# Patient Record
Sex: MALE | Race: WHITE | NOT HISPANIC OR LATINO | Employment: OTHER | URBAN - NONMETROPOLITAN AREA
[De-identification: names, ages, dates, MRNs, and addresses within clinical notes are randomized per-mention and may not be internally consistent; named-entity substitution may affect disease eponyms.]

---

## 2023-04-18 ENCOUNTER — HOSPITAL ENCOUNTER (OUTPATIENT)
Dept: DATA CONVERSION | Facility: HOSPITAL | Age: 73
End: 2023-04-18
Attending: OTOLARYNGOLOGY | Admitting: OTOLARYNGOLOGY
Payer: MEDICARE

## 2023-04-18 DIAGNOSIS — J34.2 DEVIATED NASAL SEPTUM: ICD-10-CM

## 2023-04-18 DIAGNOSIS — I25.2 OLD MYOCARDIAL INFARCTION: ICD-10-CM

## 2023-04-18 DIAGNOSIS — E78.5 HYPERLIPIDEMIA, UNSPECIFIED: ICD-10-CM

## 2023-04-18 DIAGNOSIS — E66.9 OBESITY, UNSPECIFIED: ICD-10-CM

## 2023-04-18 DIAGNOSIS — Z79.85 LONG-TERM (CURRENT) USE OF INJECTABLE NON-INSULIN ANTIDIABETIC DRUGS: ICD-10-CM

## 2023-04-18 DIAGNOSIS — N40.0 BENIGN PROSTATIC HYPERPLASIA WITHOUT LOWER URINARY TRACT SYMPTOMS: ICD-10-CM

## 2023-04-18 DIAGNOSIS — Z87.891 PERSONAL HISTORY OF NICOTINE DEPENDENCE: ICD-10-CM

## 2023-04-18 DIAGNOSIS — E11.40 TYPE 2 DIABETES MELLITUS WITH DIABETIC NEUROPATHY, UNSPECIFIED (MULTI): ICD-10-CM

## 2023-04-18 DIAGNOSIS — G47.33 OBSTRUCTIVE SLEEP APNEA (ADULT) (PEDIATRIC): ICD-10-CM

## 2023-04-18 DIAGNOSIS — Z79.84 LONG TERM (CURRENT) USE OF ORAL HYPOGLYCEMIC DRUGS: ICD-10-CM

## 2023-04-18 DIAGNOSIS — K21.9 GASTRO-ESOPHAGEAL REFLUX DISEASE WITHOUT ESOPHAGITIS: ICD-10-CM

## 2023-04-18 DIAGNOSIS — E03.9 HYPOTHYROIDISM, UNSPECIFIED: ICD-10-CM

## 2023-04-18 LAB — POCT GLUCOSE: 97 MG/DL (ref 74–99)

## 2023-09-03 ENCOUNTER — HOSPITAL ENCOUNTER (OUTPATIENT)
Dept: DATA CONVERSION | Facility: HOSPITAL | Age: 73
Discharge: HOME | End: 2023-09-06
Payer: MEDICARE

## 2023-09-03 DIAGNOSIS — I10 ESSENTIAL (PRIMARY) HYPERTENSION: ICD-10-CM

## 2023-09-03 DIAGNOSIS — E03.9 HYPOTHYROIDISM, UNSPECIFIED: ICD-10-CM

## 2023-09-03 DIAGNOSIS — R06.02 SHORTNESS OF BREATH: ICD-10-CM

## 2023-09-03 DIAGNOSIS — Z79.84 LONG TERM (CURRENT) USE OF ORAL HYPOGLYCEMIC DRUGS: ICD-10-CM

## 2023-09-03 DIAGNOSIS — I25.2 OLD MYOCARDIAL INFARCTION: ICD-10-CM

## 2023-09-03 DIAGNOSIS — R53.83 OTHER FATIGUE: ICD-10-CM

## 2023-09-03 DIAGNOSIS — R11.0 NAUSEA: ICD-10-CM

## 2023-09-03 DIAGNOSIS — Z79.85 LONG-TERM (CURRENT) USE OF INJECTABLE NON-INSULIN ANTIDIABETIC DRUGS: ICD-10-CM

## 2023-09-03 DIAGNOSIS — R07.9 CHEST PAIN, UNSPECIFIED: ICD-10-CM

## 2023-09-03 DIAGNOSIS — R06.09 OTHER FORMS OF DYSPNEA: ICD-10-CM

## 2023-09-03 DIAGNOSIS — E11.9 TYPE 2 DIABETES MELLITUS WITHOUT COMPLICATIONS (MULTI): ICD-10-CM

## 2023-09-03 DIAGNOSIS — Z79.899 OTHER LONG TERM (CURRENT) DRUG THERAPY: ICD-10-CM

## 2023-09-03 LAB — GLUCOSE BLD STRIP.AUTO-MCNC: 225 MG/DL (ref 65–99)

## 2023-09-04 LAB
ANION GAP SERPL CALCULATED.3IONS-SCNC: 11 MMOL/L (ref 0–19)
APPEARANCE PLAS: CLEAR
BUN SERPL-MCNC: 12 MG/DL (ref 8–25)
BUN/CREAT SERPL: 12 RATIO (ref 8–21)
CALCIUM SERPL-MCNC: 8.9 MG/DL (ref 8.5–10.4)
CHLORIDE SERPL-SCNC: 105 MMOL/L (ref 97–107)
CHOLEST SERPL-MCNC: 116 MG/DL (ref 133–200)
CHOLEST/HDLC SERPL: 3.1 RATIO
CO2 SERPL-SCNC: 25 MMOL/L (ref 24–31)
COLOR SPUN FLD: ABNORMAL
CREAT SERPL-MCNC: 1 MG/DL (ref 0.4–1.6)
DEPRECATED RDW RBC AUTO: 41 FL (ref 37–54)
ERYTHROCYTE [DISTWIDTH] IN BLOOD BY AUTOMATED COUNT: 12.9 % (ref 11.7–15)
FASTING STATUS PATIENT QL REPORTED: ABNORMAL
GFR SERPL CREATININE-BSD FRML MDRD: 80 ML/MIN/1.73 M2
GLUCOSE BLD STRIP.AUTO-MCNC: 129 MG/DL (ref 65–99)
GLUCOSE BLD STRIP.AUTO-MCNC: 141 MG/DL (ref 65–99)
GLUCOSE BLD STRIP.AUTO-MCNC: 95 MG/DL (ref 65–99)
GLUCOSE BLD STRIP.AUTO-MCNC: 96 MG/DL (ref 65–99)
GLUCOSE SERPL-MCNC: 95 MG/DL (ref 65–99)
HBA1C MFR BLD: 6 % (ref 4–6)
HCT VFR BLD AUTO: 40.5 % (ref 41–50)
HDLC SERPL-MCNC: 38 MG/DL
HGB BLD-MCNC: 13.7 GM/DL (ref 13.5–16.5)
HS TROPONIN T DELTA: ABNORMAL (ref 0–4)
LDLC SERPL CALC-MCNC: 64 MG/DL (ref 65–130)
MCH RBC QN AUTO: 29.5 PG (ref 26–34)
MCHC RBC AUTO-ENTMCNC: 33.8 % (ref 31–37)
MCV RBC AUTO: 87.3 FL (ref 80–100)
NRBC BLD-RTO: 0 /100 WBC
PLATELET # BLD AUTO: 221 K/UL (ref 150–450)
PMV BLD AUTO: 9.5 CU (ref 7–12.6)
POTASSIUM SERPL-SCNC: 4.2 MMOL/L (ref 3.4–5.1)
RBC # BLD AUTO: 4.64 M/UL (ref 4.5–5.5)
SODIUM SERPL-SCNC: 141 MMOL/L (ref 133–145)
T4 FREE SERPL-MCNC: 2.2 NG/DL (ref 0.9–1.7)
TRIGL SERPL-MCNC: 68 MG/DL (ref 40–150)
TROPONIN T SERPL-MCNC: 20 NG/L
TSH SERPL DL<=0.05 MIU/L-ACNC: 0.12 MIU/L (ref 0.27–4.2)
WBC # BLD AUTO: 5.4 K/UL (ref 4.5–11)

## 2023-09-05 LAB
ANION GAP SERPL CALCULATED.3IONS-SCNC: 12 MMOL/L (ref 0–19)
BUN SERPL-MCNC: 15 MG/DL (ref 8–25)
BUN/CREAT SERPL: 16.7 RATIO (ref 8–21)
CALCIUM SERPL-MCNC: 9.1 MG/DL (ref 8.5–10.4)
CHLORIDE SERPL-SCNC: 106 MMOL/L (ref 97–107)
CO2 SERPL-SCNC: 24 MMOL/L (ref 24–31)
CREAT SERPL-MCNC: 0.9 MG/DL (ref 0.4–1.6)
DEPRECATED RDW RBC AUTO: 40.3 FL (ref 37–54)
ERYTHROCYTE [DISTWIDTH] IN BLOOD BY AUTOMATED COUNT: 12.8 % (ref 11.7–15)
GFR SERPL CREATININE-BSD FRML MDRD: 91 ML/MIN/1.73 M2
GLUCOSE BLD STRIP.AUTO-MCNC: 100 MG/DL (ref 65–99)
GLUCOSE BLD STRIP.AUTO-MCNC: 103 MG/DL (ref 65–99)
GLUCOSE BLD STRIP.AUTO-MCNC: 97 MG/DL (ref 65–99)
GLUCOSE BLD STRIP.AUTO-MCNC: 99 MG/DL (ref 65–99)
GLUCOSE SERPL-MCNC: 102 MG/DL (ref 65–99)
HCT VFR BLD AUTO: 41.1 % (ref 41–50)
HGB BLD-MCNC: 14.1 GM/DL (ref 13.5–16.5)
MCH RBC QN AUTO: 29.7 PG (ref 26–34)
MCHC RBC AUTO-ENTMCNC: 34.3 % (ref 31–37)
MCV RBC AUTO: 86.7 FL (ref 80–100)
NRBC BLD-RTO: 0 /100 WBC
PLATELET # BLD AUTO: 204 K/UL (ref 150–450)
PMV BLD AUTO: 9.2 CU (ref 7–12.6)
POTASSIUM SERPL-SCNC: 4 MMOL/L (ref 3.4–5.1)
RBC # BLD AUTO: 4.74 M/UL (ref 4.5–5.5)
SODIUM SERPL-SCNC: 142 MMOL/L (ref 133–145)
WBC # BLD AUTO: 6.3 K/UL (ref 4.5–11)

## 2023-09-06 PROBLEM — J34.2 DEVIATED NASAL SEPTUM: Status: ACTIVE | Noted: 2023-09-06

## 2023-09-06 PROBLEM — M54.16 LUMBAR RADICULOPATHY: Status: ACTIVE | Noted: 2023-09-06

## 2023-09-06 PROBLEM — R06.2 WHEEZING: Status: ACTIVE | Noted: 2022-06-24

## 2023-09-06 PROBLEM — K57.92 DIVERTICULITIS: Status: ACTIVE | Noted: 2023-09-06

## 2023-09-06 PROBLEM — E11.65 TYPE 2 DIABETES MELLITUS WITH HYPERGLYCEMIA, WITHOUT LONG-TERM CURRENT USE OF INSULIN (MULTI): Status: ACTIVE | Noted: 2023-09-06

## 2023-09-06 PROBLEM — K60.2 ANAL FISSURE: Status: ACTIVE | Noted: 2023-09-06

## 2023-09-06 PROBLEM — E55.9 VITAMIN D DEFICIENCY: Status: ACTIVE | Noted: 2021-03-16

## 2023-09-06 PROBLEM — T14.8XXA SUPERFICIAL INJURY: Status: ACTIVE | Noted: 2023-09-06

## 2023-09-06 PROBLEM — K62.5 RECTAL HEMORRHAGE: Status: ACTIVE | Noted: 2023-09-06

## 2023-09-06 PROBLEM — G47.33 OBSTRUCTIVE SLEEP APNEA, ADULT: Status: ACTIVE | Noted: 2023-09-06

## 2023-09-06 PROBLEM — J34.3 HYPERTROPHY OF BOTH INFERIOR NASAL TURBINATES: Status: ACTIVE | Noted: 2023-09-06

## 2023-09-06 PROBLEM — J31.0 CHRONIC RHINITIS: Status: ACTIVE | Noted: 2023-09-06

## 2023-09-06 PROBLEM — R73.09 ELEVATED GLUCOSE LEVEL: Status: ACTIVE | Noted: 2021-03-16

## 2023-09-06 PROBLEM — M17.12 PRIMARY OSTEOARTHRITIS OF LEFT KNEE: Status: ACTIVE | Noted: 2023-09-06

## 2023-09-06 PROBLEM — E03.9 HYPOTHYROIDISM: Status: ACTIVE | Noted: 2021-07-20

## 2023-09-06 PROBLEM — Z96.651 HISTORY OF TOTAL RIGHT KNEE REPLACEMENT: Status: ACTIVE | Noted: 2023-09-06

## 2023-09-06 PROBLEM — E53.8 VITAMIN B 12 DEFICIENCY: Status: ACTIVE | Noted: 2021-03-16

## 2023-09-06 PROBLEM — E66.01 MORBID OBESITY (MULTI): Status: ACTIVE | Noted: 2023-09-06

## 2023-09-06 PROBLEM — K21.9 CHRONIC GERD: Status: ACTIVE | Noted: 2022-11-01

## 2023-09-06 PROBLEM — K64.8 BLEEDING INTERNAL HEMORRHOIDS: Status: ACTIVE | Noted: 2023-09-06

## 2023-09-06 PROBLEM — E06.3 AUTOIMMUNE THYROIDITIS: Status: ACTIVE | Noted: 2023-09-06

## 2023-09-06 PROBLEM — E78.2 MIXED HYPERLIPIDEMIA: Status: ACTIVE | Noted: 2023-09-06

## 2023-09-06 PROBLEM — H66.93 BOM (BILATERAL OTITIS MEDIA): Status: ACTIVE | Noted: 2019-10-17

## 2023-09-06 PROBLEM — G47.00 INSOMNIA: Status: ACTIVE | Noted: 2020-06-25

## 2023-09-06 PROBLEM — R41.3 LOSS OF MEMORY: Status: ACTIVE | Noted: 2019-10-17

## 2023-09-06 PROBLEM — R73.09 ELEVATED HEMOGLOBIN A1C: Status: ACTIVE | Noted: 2021-03-23

## 2023-09-06 LAB — GLUCOSE BLD STRIP.AUTO-MCNC: 100 MG/DL (ref 65–99)

## 2023-09-06 RX ORDER — METHYLPREDNISOLONE 4 MG/1
TABLET ORAL
COMMUNITY
End: 2024-02-05

## 2023-09-06 RX ORDER — FLUTICASONE PROPIONATE AND SALMETEROL 250; 50 UG/1; UG/1
1 POWDER RESPIRATORY (INHALATION)
COMMUNITY
Start: 2022-06-24 | End: 2024-04-24 | Stop reason: ALTCHOICE

## 2023-09-06 RX ORDER — ROSUVASTATIN CALCIUM 5 MG/1
1 TABLET, COATED ORAL NIGHTLY
COMMUNITY
Start: 2021-07-20 | End: 2024-02-05

## 2023-09-06 RX ORDER — PIOGLITAZONEHYDROCHLORIDE 15 MG/1
TABLET ORAL
COMMUNITY
Start: 2022-05-24

## 2023-09-06 RX ORDER — FLUTICASONE PROPIONATE 50 MCG
2 SPRAY, SUSPENSION (ML) NASAL DAILY
COMMUNITY
Start: 2019-11-11

## 2023-09-06 RX ORDER — ACETAMINOPHEN, DIPHENHYDRAMINE HCL, PHENYLEPHRINE HCL 325; 25; 5 MG/1; MG/1; MG/1
1 TABLET ORAL
COMMUNITY
Start: 2021-07-15 | End: 2024-02-05 | Stop reason: WASHOUT

## 2023-09-06 RX ORDER — IBUPROFEN 200 MG
1 TABLET ORAL EVERY 6 HOURS PRN
COMMUNITY

## 2023-09-06 RX ORDER — DULAGLUTIDE 1.5 MG/.5ML
INJECTION, SOLUTION SUBCUTANEOUS
COMMUNITY

## 2023-09-06 RX ORDER — ROSUVASTATIN CALCIUM 10 MG/1
1 TABLET, COATED ORAL DAILY
COMMUNITY
Start: 2022-05-24 | End: 2024-02-05 | Stop reason: WASHOUT

## 2023-09-06 RX ORDER — DULAGLUTIDE 0.75 MG/.5ML
0.75 INJECTION, SOLUTION SUBCUTANEOUS
COMMUNITY
Start: 2022-11-01 | End: 2024-02-05 | Stop reason: WASHOUT

## 2023-09-06 RX ORDER — FLUTICASONE PROPIONATE 93 UG/1
1 SPRAY, METERED NASAL 2 TIMES DAILY
COMMUNITY
Start: 2021-11-15 | End: 2024-02-05 | Stop reason: WASHOUT

## 2023-09-06 RX ORDER — LEVOTHYROXINE SODIUM 300 UG/1
TABLET ORAL
COMMUNITY
Start: 2021-07-15 | End: 2024-02-05

## 2023-09-06 RX ORDER — METFORMIN HYDROCHLORIDE 500 MG/1
500 TABLET, EXTENDED RELEASE ORAL 2 TIMES DAILY
COMMUNITY
Start: 2023-07-19 | End: 2024-02-05 | Stop reason: WASHOUT

## 2023-09-06 RX ORDER — BLOOD SUGAR DIAGNOSTIC
STRIP MISCELLANEOUS DAILY
COMMUNITY
Start: 2023-01-26 | End: 2024-04-24 | Stop reason: SDUPTHER

## 2023-09-06 RX ORDER — BUDESONIDE 0.5 MG/2ML
INHALANT ORAL
COMMUNITY
Start: 2023-04-18 | End: 2024-02-05 | Stop reason: WASHOUT

## 2023-09-06 RX ORDER — PANTOPRAZOLE SODIUM 40 MG/1
1 TABLET, DELAYED RELEASE ORAL DAILY
COMMUNITY
Start: 2021-03-16 | End: 2023-11-14 | Stop reason: SDUPTHER

## 2023-09-06 RX ORDER — HYDROCODONE BITARTRATE AND ACETAMINOPHEN 5; 325 MG/1; MG/1
1 TABLET ORAL EVERY 6 HOURS PRN
COMMUNITY
End: 2024-04-24 | Stop reason: ALTCHOICE

## 2023-09-06 RX ORDER — LEVOCETIRIZINE DIHYDROCHLORIDE 5 MG/1
5 TABLET, FILM COATED ORAL EVERY EVENING
COMMUNITY
Start: 2019-11-11 | End: 2024-04-24 | Stop reason: ALTCHOICE

## 2023-09-06 RX ORDER — AZELASTINE 1 MG/ML
2 SPRAY, METERED NASAL 2 TIMES DAILY
COMMUNITY
Start: 2021-03-16 | End: 2024-02-05

## 2023-09-06 RX ORDER — ACETAMINOPHEN 500 MG
TABLET ORAL
COMMUNITY
Start: 2021-07-15

## 2023-09-07 VITALS
BODY MASS INDEX: 34.4 KG/M2 | DIASTOLIC BLOOD PRESSURE: 85 MMHG | SYSTOLIC BLOOD PRESSURE: 142 MMHG | HEIGHT: 70 IN | WEIGHT: 240.3 LBS | TEMPERATURE: 97 F | HEART RATE: 77 BPM | RESPIRATION RATE: 17 BRPM

## 2023-09-14 NOTE — H&P
History of Present Illness:   History Present Illness:  Reason for surgery: Difficulty breathing   HPI:    72-year-old male presents today for endoscopic sinus surgery and septoplasty.  He saw Dr. Florse in the ENT office on 2/22/2023, note is as follows:     Mr. Espinosa is a 71 yo M. He cannot breath well from his nose for years. It is worse at his right side.  He has tried nasal steroid spray for months, could nor find relief.  On examination, nasal septum deviated to right, right nasal passage looks tight. Dryness crusting in left nasal cavity (+)  Dx:  1- Deviated nasal septum  Plan:  1- CT sinus for surgical planning  2- septoplasty, turbinoplasty    Today he presents as an add-on case.  He has more the same symptoms they continue to slowly worsen.  His symptoms have bothered him for many years.  He denies trauma that may have caused the deviated  septum.  He does note occasional blood when blowing his nose.  No CT on record for surgical planning.    PMHx: DM, ANDREI, hypothyroidism, GERD, BPH  PSHx: TKA, neck surgery  SHX: Former smoker  FHX: noncontributory    Allergies:        Allergies:  ·  Percocet : Hives/Urticaria       Intolerances:  ·  nitroglycerin : ECG Abnormal (Moderate)    Home Medication Review:   Home Medications Reviewed: yes     Impression/Procedure:   ·  Impression and Planned Procedure: Deviated nasal septum, hypertrophy of inferior nasal turbinates  ESS, septoplasty       ERAS (Enhanced Recovery After Surgery):  ·  ERAS Patient: no     Review of Systems:   Review of Systems:  Constitutional: NEGATIVE: Fever, Chills     ENMT: POSITIVE: Nasal Congestion; NEGATIVE: Nasal  Discharge, Throat Pain     Respiratory: NEGATIVE: Shortness of Breath     Cardiac: NEGATIVE: Chest Pain     Gastrointestinal: NEGATIVE: Abdominal Pain     All Other Systems: All other systems reviewed and  are negative       Vital Signs:  Temperature C: 36.1 degrees C   Temperature F: 96.9 degrees F   Heart Rate: 77 beats per  minute   Respiratory Rate: 17 breath per minute   Blood Pressure Systolic: 142 mm/Hg   Blood Pressure Diastolic: 85 mm/Hg     Physical Exam by System:    Constitutional: Well developed, awake/alert/oriented  x3, no distress, alert and cooperative   Eyes: EOMI intact   ENMT: no apparent injury, no lesions seen  Visible deviated septum to the right   Head/Neck: Neck supple, no apparent injury   Respiratory/Thorax: CTAB   Cardiovascular: Regular, rate and rhythm, no murmurs   Gastrointestinal: No obvious abdominal distention   Musculoskeletal: Moving all extremities without difficulty   Extremities: No edema   Neurological: alert and oriented x3   Psychological: Appropriate mood and behavior   Skin: Warm and dry, no lesions, no rashes     Consent:   COVID-19 Consent:  ·  COVID-19 Risk Consent Surgeon has reviewed key risks related to the risk of marti COVID-19 and if they contract COVID-19 what the risks are.     Attestation:   Note Completion:  I am a:  Advanced Practice Provider   Attending Only - Shared Visit with Advanced Practice Provider This is a shared visit.  I have reviewed the Advanced Practice Provider?s encounter note, approve the Advanced Practice Provider?s documentation,  and provide the following additional information from my personal encounter.    Comments/ Additional Findings    none          Electronic Signatures:  Ra Mancuso (PAC)  (Signed 18-Apr-2023 13:25)   Authored: History of Present Illness, Allergies, Home  Medication Review, Impression/Procedure, ERAS, Review of Systems, Physical Exam, Consent, Note Completion  Conner Flores)  (Signed 18-Apr-2023 16:24)   Authored: Note Completion   Co-Signer: History of Present Illness, Allergies, Home Medication Review, Impression/Procedure, ERAS, Review of Systems, Physical Exam,  Consent, Note Completion      Last Updated: 18-Apr-2023 16:24 by Conner Flores)

## 2023-09-26 ENCOUNTER — HOSPITAL ENCOUNTER (OUTPATIENT)
Dept: DATA CONVERSION | Facility: HOSPITAL | Age: 73
Discharge: HOME | End: 2023-09-26
Payer: MEDICARE

## 2023-09-26 DIAGNOSIS — R05.1 ACUTE COUGH: ICD-10-CM

## 2023-09-28 ENCOUNTER — HOSPITAL ENCOUNTER (OUTPATIENT)
Dept: DATA CONVERSION | Facility: HOSPITAL | Age: 73
Discharge: HOME | End: 2023-09-28
Payer: MEDICARE

## 2023-09-28 DIAGNOSIS — E06.3 AUTOIMMUNE THYROIDITIS: ICD-10-CM

## 2023-09-28 LAB — TSH SERPL DL<=0.05 MIU/L-ACNC: 0.62 MIU/L (ref 0.27–4.2)

## 2023-10-02 NOTE — OP NOTE
Post Operative Note:     PreOp Diagnosis: Deviated nasal septum   Post-Procedure Diagnosis: Deviated nasal septum   Procedure: 1.  Septoplasty  2.  radiofrequency turbinate reduction   Surgeon: Conner Flores MD   Resident/Fellow/Other Assistant: None   Anesthesia: General LR   Estimated Blood Loss (mL): 10   Specimen: no   Complications: None   Findings: septum severely deviated to right.   Patient Returned To/Condition: PACU-stable     Attestation:   Note Completion:  Attending Attestation I performed the procedure without a resident    Comments/ Additional Findings    HISTORY:    Mr. Espinosa is a 71 yo M. He cannot breath well from his nose for years. It is worse at his right side.  He has tried nasal steroid spray for months, could nor find relief.  On examination, nasal septum deviated to right, right nasal passage looks tight. Dryness crusting in left nasal cavity (+)    Dx:  1- Deviated nasal septum    Plan:  1- CT sinus for surgical planning  2- septoplasty, turbinoplasty    DESCRIPTION OF THE PROCEDURE:  The patient was brought to the operative suite, placed supine on the operative table.  Anesthesia was administered by Anesthesia Department.  Please refer  to their records for details.  Time-out was already called in PACU.  The patient was draped and prepped for the operation.   Endoscope was advanced through the patient's nasal cavity. On examination, septum was deviated to right severely. There was chronic inflammation of nasal mucosa.  Septal mucosa was infiltrated with lidocaine, bupivacaine and epinephrine.  A right modified Wray incision was done.  Mucoperichondrial flaps were raised on both sides. Curved iris scissors  and Lashay knife was used to incise the cartilaginous portion of the septum superiorly. A quadrangular piece of cartilage was resected.  Blakesley, freer elevators, trucut and pituitary  forceps were used to remove the deviated portions of cartilaginous and bony septum. L strut  was left to support the dorsum and the tip of the nose.  Radiofrequency was applied to inferior and middle turbinates to shrink them.  Septum stapler was used to suture the septal mucosal flaps. Septoplasty incision was sutured back using 5.0 fast absorbing gut.  Merocel with bacitracin were placed into both nasal cavities. Procedure was concluded.    DISPOSITION:  Discharge home.    FOLLOW UP:  In 3 weeks in ENT clinic.    MEDICATIONS:  1- Oral antibiotic tab,  2- Tylenol number 3, as needed for pain  3- Nasal rinse and antibiotic ointment inside the nose  twice a day.        Electronic Signatures:  Ra Mancuso (PAC)  (Signed 18-Apr-2023 13:37)   Authored: Post Operative Note, Note Completion  Conner Flores)  (Signed 18-Apr-2023 16:06)   Authored: Post Operative Note, Note Completion   Co-Signer: Post Operative Note, Note Completion      Last Updated: 18-Apr-2023 16:06 by Conner Flores)

## 2023-10-24 ENCOUNTER — OFFICE VISIT (OUTPATIENT)
Dept: CARDIOLOGY | Facility: CLINIC | Age: 73
End: 2023-10-24
Payer: MEDICARE

## 2023-10-24 VITALS
OXYGEN SATURATION: 96 % | HEART RATE: 60 BPM | TEMPERATURE: 98.6 F | WEIGHT: 239 LBS | RESPIRATION RATE: 18 BRPM | BODY MASS INDEX: 34.22 KG/M2 | DIASTOLIC BLOOD PRESSURE: 88 MMHG | HEIGHT: 70 IN | SYSTOLIC BLOOD PRESSURE: 160 MMHG

## 2023-10-24 DIAGNOSIS — R06.02 SHORTNESS OF BREATH: ICD-10-CM

## 2023-10-24 DIAGNOSIS — T73.3XXD FATIGUE DUE TO EXCESSIVE EXERTION, SUBSEQUENT ENCOUNTER: ICD-10-CM

## 2023-10-24 DIAGNOSIS — I10 PRIMARY HYPERTENSION: Primary | ICD-10-CM

## 2023-10-24 DIAGNOSIS — I10 ESSENTIAL HYPERTENSION: ICD-10-CM

## 2023-10-24 DIAGNOSIS — E78.2 MIXED HYPERLIPIDEMIA: ICD-10-CM

## 2023-10-24 PROBLEM — G89.29 CHRONIC INTRACTABLE HEADACHE: Status: ACTIVE | Noted: 2023-10-24

## 2023-10-24 PROBLEM — R41.3 MEMORY LOSS: Status: ACTIVE | Noted: 2023-10-24

## 2023-10-24 PROBLEM — G56.01 CARPAL TUNNEL SYNDROME OF RIGHT WRIST: Status: ACTIVE | Noted: 2023-10-24

## 2023-10-24 PROBLEM — R20.2 PARESTHESIAS: Status: ACTIVE | Noted: 2023-10-24

## 2023-10-24 PROBLEM — G44.52 NEW DAILY PERSISTENT HEADACHE: Status: ACTIVE | Noted: 2023-10-24

## 2023-10-24 PROBLEM — M54.16 LUMBAR RADICULOPATHY: Status: ACTIVE | Noted: 2019-01-11

## 2023-10-24 PROBLEM — R51.9 CHRONIC INTRACTABLE HEADACHE: Status: ACTIVE | Noted: 2023-10-24

## 2023-10-24 PROCEDURE — 1159F MED LIST DOCD IN RCRD: CPT | Performed by: INTERNAL MEDICINE

## 2023-10-24 PROCEDURE — 3079F DIAST BP 80-89 MM HG: CPT | Performed by: INTERNAL MEDICINE

## 2023-10-24 PROCEDURE — 3077F SYST BP >= 140 MM HG: CPT | Performed by: INTERNAL MEDICINE

## 2023-10-24 PROCEDURE — 93000 ELECTROCARDIOGRAM COMPLETE: CPT | Performed by: INTERNAL MEDICINE

## 2023-10-24 PROCEDURE — 1126F AMNT PAIN NOTED NONE PRSNT: CPT | Performed by: INTERNAL MEDICINE

## 2023-10-24 PROCEDURE — 1036F TOBACCO NON-USER: CPT | Performed by: INTERNAL MEDICINE

## 2023-10-24 PROCEDURE — 99214 OFFICE O/P EST MOD 30 MIN: CPT | Performed by: INTERNAL MEDICINE

## 2023-10-24 RX ORDER — AMOXICILLIN AND CLAVULANATE POTASSIUM 500; 125 MG/1; MG/1
1 TABLET, FILM COATED ORAL 2 TIMES DAILY
COMMUNITY
Start: 2022-12-13 | End: 2022-12-23

## 2023-10-24 RX ORDER — INDOMETHACIN 50 MG/1
50 CAPSULE ORAL
COMMUNITY
End: 2024-04-24 | Stop reason: ALTCHOICE

## 2023-10-24 RX ORDER — VERAPAMIL HYDROCHLORIDE 240 MG/1
240 TABLET, FILM COATED, EXTENDED RELEASE ORAL DAILY
COMMUNITY
End: 2023-10-24

## 2023-10-24 RX ORDER — DULAGLUTIDE 1.5 MG/.5ML
1.5 INJECTION, SOLUTION SUBCUTANEOUS
COMMUNITY
Start: 2023-03-01 | End: 2024-02-05 | Stop reason: WASHOUT

## 2023-10-24 RX ORDER — TIZANIDINE 4 MG/1
4 TABLET ORAL EVERY 6 HOURS PRN
COMMUNITY

## 2023-10-24 RX ORDER — BLOOD SUGAR DIAGNOSTIC
1 STRIP MISCELLANEOUS SEE ADMIN INSTRUCTIONS
COMMUNITY
Start: 2023-01-26

## 2023-10-24 RX ORDER — METHYLPREDNISOLONE 4 MG/1
4 TABLET ORAL DAILY
COMMUNITY
End: 2024-02-05

## 2023-10-24 RX ORDER — GABAPENTIN 300 MG/1
300 CAPSULE ORAL DAILY
COMMUNITY
End: 2024-04-24 | Stop reason: ALTCHOICE

## 2023-10-24 RX ORDER — PANTOPRAZOLE SODIUM 40 MG/1
40 TABLET, DELAYED RELEASE ORAL
COMMUNITY
End: 2024-03-05 | Stop reason: SDUPTHER

## 2023-10-24 RX ORDER — BUDESONIDE 0.5 MG/2ML
0.5 INHALANT ORAL
COMMUNITY
Start: 2023-04-18 | End: 2024-02-05 | Stop reason: WASHOUT

## 2023-10-24 RX ORDER — MUPIROCIN 20 MG/G
1 OINTMENT TOPICAL 2 TIMES DAILY
COMMUNITY
Start: 2023-10-04

## 2023-10-24 RX ORDER — DIVALPROEX SODIUM 250 MG/1
250 TABLET, FILM COATED, EXTENDED RELEASE ORAL 2 TIMES DAILY
COMMUNITY
End: 2024-02-05

## 2023-10-24 RX ORDER — ASPIRIN 81 MG/1
81 TABLET ORAL DAILY
COMMUNITY

## 2023-10-24 RX ORDER — ACETAMINOPHEN AND CODEINE PHOSPHATE 300; 30 MG/1; MG/1
1 TABLET ORAL EVERY 4 HOURS PRN
COMMUNITY
Start: 2023-04-18 | End: 2024-04-24 | Stop reason: ALTCHOICE

## 2023-10-24 RX ORDER — METFORMIN HYDROCHLORIDE 500 MG/1
500 TABLET, EXTENDED RELEASE ORAL 2 TIMES DAILY
COMMUNITY
Start: 2023-07-19 | End: 2024-02-05 | Stop reason: SDUPTHER

## 2023-10-24 RX ORDER — RANOLAZINE 500 MG/1
500 TABLET, EXTENDED RELEASE ORAL 2 TIMES DAILY
COMMUNITY
Start: 2023-09-06 | End: 2023-10-24 | Stop reason: ALTCHOICE

## 2023-10-24 RX ORDER — TRAZODONE HYDROCHLORIDE 100 MG/1
100 TABLET ORAL NIGHTLY
COMMUNITY
End: 2024-04-24 | Stop reason: ALTCHOICE

## 2023-10-24 RX ORDER — CHLORPHENIRAMINE MALEATE 4 MG
4 TABLET ORAL EVERY 6 HOURS PRN
COMMUNITY
End: 2024-02-05

## 2023-10-24 RX ORDER — PIOGLITAZONEHYDROCHLORIDE 15 MG/1
15 TABLET ORAL DAILY
COMMUNITY
Start: 2023-07-19 | End: 2024-02-05 | Stop reason: WASHOUT

## 2023-10-24 RX ORDER — ROSUVASTATIN CALCIUM 10 MG/1
10 TABLET, COATED ORAL DAILY
COMMUNITY
Start: 2023-06-27 | End: 2024-02-05 | Stop reason: WASHOUT

## 2023-10-24 RX ORDER — LOSARTAN POTASSIUM AND HYDROCHLOROTHIAZIDE 12.5; 5 MG/1; MG/1
1 TABLET ORAL DAILY
Qty: 30 TABLET | Refills: 11 | Status: SHIPPED | OUTPATIENT
Start: 2023-10-24 | End: 2024-02-05 | Stop reason: WASHOUT

## 2023-10-24 RX ORDER — POLYETHYLENE GLYCOL 3350, SODIUM CHLORIDE, SODIUM BICARBONATE, POTASSIUM CHLORIDE 420; 11.2; 5.72; 1.48 G/4L; G/4L; G/4L; G/4L
4000 POWDER, FOR SOLUTION ORAL ONCE
COMMUNITY
Start: 2023-01-10 | End: 2024-04-24 | Stop reason: ALTCHOICE

## 2023-10-24 RX ORDER — LEVOTHYROXINE SODIUM 200 UG/1
200 TABLET ORAL DAILY
COMMUNITY
End: 2023-10-24 | Stop reason: SDUPTHER

## 2023-10-24 RX ORDER — LEVOTHYROXINE SODIUM 300 UG/1
300 TABLET ORAL DAILY
COMMUNITY
End: 2024-02-05

## 2023-10-24 ASSESSMENT — ENCOUNTER SYMPTOMS
LOSS OF SENSATION IN FEET: 1
OCCASIONAL FEELINGS OF UNSTEADINESS: 0
DEPRESSION: 0

## 2023-10-24 ASSESSMENT — PATIENT HEALTH QUESTIONNAIRE - PHQ9
2. FEELING DOWN, DEPRESSED OR HOPELESS: NOT AT ALL
SUM OF ALL RESPONSES TO PHQ9 QUESTIONS 1 AND 2: 0
1. LITTLE INTEREST OR PLEASURE IN DOING THINGS: NOT AT ALL

## 2023-10-24 ASSESSMENT — PAIN SCALES - GENERAL: PAINLEVEL: 0-NO PAIN

## 2023-10-24 NOTE — PROGRESS NOTES
Subjective   Chief Complaint   Patient presents with    Hospital Follow-up     Mr. Espinosa is present for his Follow up appt after having a stay         Patient with a history of for hypertension, obesity, hypothyroidism.  Patient was admitted in September at the [facility]  Patient had a nuclear stress test essentially negative for ischemia with ejection fraction 56%.  Now here for office follow-up.  No active angina CHF signs symptoms.  Shortness of breath with mild-moderate activity.  Currently only taking cardiac medications for lipid therapy Crestor.  Underlying morbid obesity.    History reviewed. No pertinent past medical history.  Past Surgical History:   Procedure Laterality Date    NECK SURGERY Right     TOTAL KNEE ARTHROPLASTY Right 2017     No relevant family history has been documented for this patient.  Current Outpatient Medications   Medication Sig Dispense Refill    aspirin 81 mg EC tablet Take 1 tablet (81 mg) by mouth once daily.      chlorpheniramine (Chlor-Trimeton) 4 mg tablet Take 1 tablet (4 mg) by mouth every 6 hours if needed for allergies.      levothyroxine (Synthroid, Unithroid) 300 mcg tablet Take 1 tablet (300 mcg) by mouth once daily.      metFORMIN  mg 24 hr tablet Take 1 tablet (500 mg) by mouth 2 times a day.      OneTouch Ultra Test strip 1 strip by Does not apply route see administration instructions.      pantoprazole (ProtoNix) 40 mg EC tablet Take 1 tablet (40 mg) by mouth once daily in the morning. Take before meals.      pioglitazone (Actos) 15 mg tablet Take 1 tablet (15 mg) by mouth once daily.      rosuvastatin (Crestor) 10 mg tablet Take 1 tablet (10 mg) by mouth once daily.      Trulicity 1.5 mg/0.5 mL pen injector injection Inject 1.5 mg under the skin 1 (one) time per week.      acetaminophen-codeine (Tylenol w/ Codeine #3) 300-30 mg tablet Take 1 tablet by mouth every 4 hours if needed for severe pain (7 - 10).      budesonide (Pulmicort) 0.5 mg/2 mL  "nebulizer solution Take 2 mL (0.5 mg) by nebulization once daily.      divalproex (Depakote ER) 250 mg 24 hr tablet Take 1 tablet (250 mg) by mouth 2 times a day.      gabapentin (Neurontin) 300 mg capsule Take 1 capsule (300 mg) by mouth once daily.      indomethacin (Indocin) 50 mg capsule Take 1 capsule (50 mg) by mouth 2 times a day with meals.      methylPREDNISolone (Medrol, Roman,) 4 mg tablets Take 1 tablet (4 mg) by mouth once daily.      mupirocin (Bactroban) 2 % ointment Apply 1 Application topically 2 times a day.      polyethylene glycol-electrolytes 420 gram solution Take 4,000 mL by mouth 1 time.      tiZANidine (Zanaflex) 4 mg tablet Take 1 tablet (4 mg) by mouth every 6 hours if needed for muscle spasms.      traZODone (Desyrel) 100 mg tablet Take 1 tablet (100 mg) by mouth once daily at bedtime.       No current facility-administered medications for this visit.      reports that he has quit smoking. His smoking use included cigarettes. He has a 60.00 pack-year smoking history. He has never used smokeless tobacco. He reports that he does not currently use alcohol.  Nitroglycerin and Oxycodone-acetaminophen  Primary hypertension    Vitals:    10/24/23 1311   BP: 160/88   Pulse: 60   Resp: 18   Temp: 37 °C (98.6 °F)   TempSrc: Core   SpO2: 96%   Weight: 108 kg (239 lb)   Height: 1.778 m (5' 10\")   PainSc: 0-No pain      BMI:Body mass index is 34.29 kg/m².   General Cardiology:  General Appearance: Alert, oriented and in no acute distress.  HEENT: extra ocular movements intact (EOMI), pupils equal,  round, reactive to light and accommodation (PERRLA).  Carotid Upstroke: no bruit, normal.  Jugular Venous Distention (JVD): flat.  Chest: normal.  Lungs: Clear to auscultation,   Heart Sounds: no S3 or S4, normal S1, S2, regular rate.  Murmur, Click, Gallop: Soft systolic murmur.  Abdomen: no hepatomegaly, no masses felt, soft.  Extremities: No leg edema.  Peripheral pulses: 2 plus bilateral.  NEUROLOGY " Cranial nerves II-XII grossly intact.     Patient Active Problem List   Diagnosis    Carpal tunnel syndrome of right wrist    Chronic intractable headache    Lumbar radiculopathy    Memory loss    New daily persistent headache    Paresthesias       Problem List Items Addressed This Visit    None  Visit Diagnoses       Primary hypertension    -  Primary    Relevant Orders    ECG 12 Lead    Shortness of breath        Fatigue due to excessive exertion, subsequent encounter               Patient seen and examined underlying history of hypertension hyperlipidemia.  Patient also history of diabetes.  Underlying morbid obesity status post negative nuclear stress test for ischemia.  EKG was done essentially showed underlying sinus rhythm 64 with a nonspecific ST-T changes seen with a left axis deviation.  Patient does have underlying borderline hypertension add patient on a low-dose of losartan/HCTZ.  Modify risk factor.  Advised patient to check blood pressure twice a day for next 10 days and give us a call if her blood pressure remains above 170 systolic and diastolic above 95.  Meanwhile cut back on salt, caffeine.  If blood pressure persistently stays above 200 systolic then go to nearest emergency room or call 911.  Diet and exercise reviewed with patient..advice to walk about 10,000 steps or about 2 hours during day time. Cut back on salt, sugar and flour.  Advised patient to avoid lunch meats, canned soups, pizzas, bread rolls, and sandwiches. Advised patient to limit salt intake 1,500 mg daily. Advised patient to exercise 30 mins/3 times a week including treadmill or aerobic type, Goal to achieve 65% target HR.    Esvin Lambert MD

## 2023-11-01 ENCOUNTER — TELEPHONE (OUTPATIENT)
Dept: PHARMACY | Facility: HOSPITAL | Age: 73
End: 2023-11-01
Payer: MEDICARE

## 2023-11-01 NOTE — TELEPHONE ENCOUNTER
Population Health: Outreach by Ambulatory Pharmacy Team    Payor: [employer]ah[employer] PLATA  Reason: Adherence  Medication: ROSUVASTATIN TAB 10MG  Outcome: Patient Reached: Barriers Identified, Patient was not available at this time.     SHEA ROUSSEAU CPhT

## 2023-11-14 DIAGNOSIS — K21.9 CHRONIC GERD: ICD-10-CM

## 2023-11-14 RX ORDER — PANTOPRAZOLE SODIUM 40 MG/1
40 TABLET, DELAYED RELEASE ORAL DAILY
Qty: 90 TABLET | Refills: 3 | Status: SHIPPED | OUTPATIENT
Start: 2023-11-14 | End: 2024-02-05 | Stop reason: WASHOUT

## 2023-11-20 ENCOUNTER — TELEPHONE (OUTPATIENT)
Dept: PHARMACY | Facility: HOSPITAL | Age: 73
End: 2023-11-20
Payer: MEDICARE

## 2024-01-18 NOTE — PROGRESS NOTES
HPI             Current Outpatient Medications:     acetaminophen-codeine (Tylenol w/ Codeine #3) 300-30 mg tablet, Take 1 tablet by mouth every 4 hours if needed for severe pain (7 - 10)., Disp: , Rfl:     aspirin 81 mg EC tablet, Take 1 tablet (81 mg) by mouth once daily., Disp: , Rfl:     azelastine (Astelin) 137 mcg (0.1 %) nasal spray, Administer 2 sprays into each nostril 2 times a day., Disp: , Rfl:     blood sugar diagnostic (ONETOUCH ULTRA BLUE TEST STRIP MISC), Instructions: use one strip daily and as needed if symptomatic; Dx E11.9, Disp: , Rfl:     budesonide (Pulmicort) 0.5 mg/2 mL nebulizer solution, , Disp: , Rfl:     budesonide (Pulmicort) 0.5 mg/2 mL nebulizer solution, USE 1 VIAL( 2 MILLILITERS) ONCE A DAY AS DIRECTED, Disp: 60 mL, Rfl: 2    budesonide (Pulmicort) 0.5 mg/2 mL nebulizer solution, Take 2 mL (0.5 mg) by nebulization once daily., Disp: , Rfl:     chlorpheniramine (Chlor-Trimeton) 4 mg tablet, Take 1 tablet (4 mg) by mouth every 6 hours if needed for allergies., Disp: , Rfl:     cholecalciferol (Vitamin D-3) 5,000 Units tablet, Take by mouth., Disp: , Rfl:     cyanocobalamin, vitamin B-12, (Vitamin B-12) 5,000 mcg tablet, sublingual, Take 1 tablet by mouth., Disp: , Rfl:     divalproex (Depakote ER) 250 mg 24 hr tablet, Take 1 tablet (250 mg) by mouth 2 times a day., Disp: , Rfl:     dulaglutide (Trulicity) 0.75 mg/0.5 mL pen injector, Inject 0.75 mg under the skin., Disp: , Rfl:     dulaglutide (Trulicity) 1.5 mg/0.5 mL pen injector injection, INJECT CONTENTS OF ONE PEN, (1.5MG), UNDER THE SKIN WEEKLY for 28, Disp: , Rfl:     fluticasone (Flonase Allergy Relief) 50 mcg/actuation nasal spray, Administer 2 sprays into each nostril once daily., Disp: , Rfl:     fluticasone propion-salmeteroL (Advair Diskus) 250-50 mcg/dose diskus inhaler, Inhale 1 puff 2 times a day., Disp: , Rfl:     fluticasone propionate (Xhance) 93 mcg/actuation aerosol breath activated, Administer 1 puff into each  nostril 2 times a day., Disp: , Rfl:     gabapentin (Neurontin) 300 mg capsule, Take 1 capsule (300 mg) by mouth once daily., Disp: , Rfl:     HYDROcodone-acetaminophen (Norco) 5-325 mg tablet, Take 1 tablet by mouth every 6 hours if needed., Disp: , Rfl:     ibuprofen 200 mg tablet, Take 1 tablet (200 mg) by mouth every 6 hours if needed., Disp: , Rfl:     indomethacin (Indocin) 50 mg capsule, Take 1 capsule (50 mg) by mouth 2 times a day with meals., Disp: , Rfl:     levocetirizine (Xyzal) 5 mg tablet, Take 1 tablet (5 mg) by mouth once daily in the evening., Disp: , Rfl:     levothyroxine (Synthroid, Levoxyl) 200 mcg tablet, take 1 tablet by mouth daily, Disp: 30 tablet, Rfl: 0    levothyroxine (Synthroid, Unithroid) 300 mcg tablet, take 1 tablet by mouth Monday thru Saturday. take 1 & 1/2 tablets on Sunday, Disp: , Rfl:     levothyroxine (Synthroid, Unithroid) 300 mcg tablet, Take 1 tablet (300 mcg) by mouth once daily., Disp: , Rfl:     losartan-hydrochlorothiazide (Hyzaar) 50-12.5 mg tablet, Take 1 tablet by mouth once daily., Disp: 30 tablet, Rfl: 11    metFORMIN  mg 24 hr tablet, Take 1 tablet (500 mg) by mouth 2 times a day., Disp: , Rfl:     metFORMIN  mg 24 hr tablet, Take 1 tablet (500 mg) by mouth 2 times a day., Disp: , Rfl:     methylPREDNISolone (Medrol Dospak) 4 mg tablets, 1 package oral daily, Disp: , Rfl:     methylPREDNISolone (Medrol, Roman,) 4 mg tablets, Take 1 tablet (4 mg) by mouth once daily., Disp: , Rfl:     mupirocin (Bactroban) 2 % ointment, Apply 1 Application topically 2 times a day., Disp: , Rfl:     OneTouch Ultra Test strip, once daily., Disp: , Rfl:     OneTouch Ultra Test strip, 1 strip by Does not apply route see administration instructions., Disp: , Rfl:     pantoprazole (ProtoNix) 40 mg EC tablet, Take 1 tablet (40 mg) by mouth once daily., Disp: 90 tablet, Rfl: 3    pantoprazole (ProtoNix) 40 mg EC tablet, Take 1 tablet (40 mg) by mouth once daily in the morning.  Take before meals., Disp: , Rfl:     pioglitazone (Actos) 15 mg tablet, 1 tablet Orally Once a day, stop if swelling for 90 days, Disp: , Rfl:     pioglitazone (Actos) 15 mg tablet, Take 1 tablet (15 mg) by mouth once daily., Disp: , Rfl:     polyethylene glycol-electrolytes 420 gram solution, Take 4,000 mL by mouth 1 time., Disp: , Rfl:     rosuvastatin (Crestor) 10 mg tablet, Take 1 tablet (10 mg) by mouth once daily. For 90 days, Disp: , Rfl:     rosuvastatin (Crestor) 10 mg tablet, Take 1 tablet (10 mg) by mouth once daily., Disp: , Rfl:     rosuvastatin (Crestor) 5 mg tablet, Take 1 tablet (5 mg) by mouth once daily at bedtime., Disp: , Rfl:     tiZANidine (Zanaflex) 4 mg tablet, Take 1 tablet (4 mg) by mouth every 6 hours if needed for muscle spasms., Disp: , Rfl:     traZODone (Desyrel) 100 mg tablet, Take 1 tablet (100 mg) by mouth once daily at bedtime., Disp: , Rfl:     Trulicity 1.5 mg/0.5 mL pen injector injection, Inject 1.5 mg under the skin 1 (one) time per week., Disp: , Rfl:       Allergies as of 01/19/2024 - Reviewed 10/24/2023   Allergen Reaction Noted    Nitroglycerin Other 09/06/2023    Oxycodone-acetaminophen Hives and Rash 09/06/2023    Nitroglycerin Unknown 10/24/2023    Oxycodone-acetaminophen Hallucinations and Unknown 10/24/2023         Review of Systems   Cardiology: Lightheadedness-denies.  Chest pain-denies.  Leg edema-denies.  Palpitations-denies.  Respiratory: Cough-denies. Shortness of breath-denies.  Wheezing-denies.  Gastroenterology: Constipation-denies.  Diarrhea-denies.  Heartburn-denies.  Endocrinology: Cold intolerance-denies.  Heat intolerance-denies.  Sweats-denies.  Neurology: Headache-denies.  Tremor-denies.  Neuropathy in extremities-denies.  Psychology: Low energy-denies.  Irritability-denies.  Sleep disturbances-denies.      There were no vitals taken for this visit.      Labs:  Lab Results   Component Value Date    WBC 6.3 09/05/2023    NRBC 0 09/05/2023    RBC 4.74  09/05/2023    HGB 14.1 09/05/2023    HCT 41.1 09/05/2023     09/05/2023     Lab Results   Component Value Date    CALCIUM 9.1 09/05/2023    AST 29 08/29/2022    ALKPHOS 52 08/29/2022    BILITOT 1.1 08/29/2022    PROT 7.5 08/29/2022    ALBUMIN 4.5 08/29/2022    GLOB 3.0 08/29/2022    AGR 1.5 08/29/2022     09/05/2023    K 4.0 09/05/2023     09/05/2023    CO2 24 09/05/2023    ANIONGAP 12 09/05/2023    BUN 15 09/05/2023    CREATININE 0.9 09/05/2023    UREACREAUR 16.7 09/05/2023    GLUCOSE 102 (H) 09/05/2023    ALT 33 08/29/2022    EGFR 91 09/05/2023     Lab Results   Component Value Date    CHOL 116 (L) 09/04/2023    TRIG 68 09/04/2023    HDL 38 (L) 09/04/2023    LDLCALC 64 (L) 09/04/2023     Lab Results   Component Value Date    MICROALBCREA 11.7 05/21/2022     Lab Results   Component Value Date    TSH 0.62 09/28/2023     Lab Results   Component Value Date    WHVTBJQN51 650 05/21/2022     Lab Results   Component Value Date    HGBA1C 5.6 10/03/2023         Physical Exam   General Appearance: pleasant, cooperative, no acute distress  HEENT: no chemosis, no proptosis, no lid lag, no lid retraction  Neck: no lymphadenopathy, no thyromegaly, no dominant thyroid nodules  Heart: no murmurs, regular rate and rhythm, S1 and S2  Lungs: no wheezes, no rhonci, no rales  Extremities: no lower extremity swelling      Assessment/Plan   1. Type 2 diabetes mellitus with hyperglycemia, without long-term current use of insulin (CMS/[facility])  ***    2. Mixed hyperlipidemia  ***    3. Hypothyroidism, unspecified type  ***    4. Obstructive sleep apnea, adult  ***         Follow Up:      -labs/tests/notes reviewed  -reviewed and counseled patient on medication monitoring and side effects

## 2024-01-19 ENCOUNTER — APPOINTMENT (OUTPATIENT)
Dept: ENDOCRINOLOGY | Facility: CLINIC | Age: 74
End: 2024-01-19
Payer: MEDICARE

## 2024-02-03 NOTE — PROGRESS NOTES
HPI    72 yo with hashimoto's disease, dm2 (dx 2021), dyslipidemia, gerd.  Last A1c-5.7%, today 5.3%.             Pt has glucose meter, testing daily or less, sugars 's at different times of day, no lows. Pt is doing better on carb control in the diet. Pt walks on a daily basis.           Taking metformin 500mg ER 1 pill twice per day (as much as he tolerates), trulicity 1.5mg (lizzy cares, added last visit), diomedes 15mg, glimeperide 2mg stopped last vist           Taking crestor 10mg for lipids and tolerating.           Taking Unithroid 200mcg (yisel). Pt takes thyroid medication am and PPI qhs. Pt denies obstructive goiter sx, euthyroid by history.        Current Outpatient Medications:     acetaminophen-codeine (Tylenol w/ Codeine #3) 300-30 mg tablet, Take 1 tablet by mouth every 4 hours if needed for severe pain (7 - 10)., Disp: , Rfl:     aspirin 81 mg EC tablet, Take 1 tablet (81 mg) by mouth once daily., Disp: , Rfl:     budesonide (Pulmicort) 0.5 mg/2 mL nebulizer solution, USE 1 VIAL( 2 MILLILITERS) ONCE A DAY AS DIRECTED, Disp: 60 mL, Rfl: 2    cholecalciferol (Vitamin D-3) 5,000 Units tablet, Take by mouth., Disp: , Rfl:     dulaglutide (Trulicity) 1.5 mg/0.5 mL pen injector injection, INJECT CONTENTS OF ONE PEN, (1.5MG), UNDER THE SKIN WEEKLY for 28, Disp: , Rfl:     fluticasone (Flonase Allergy Relief) 50 mcg/actuation nasal spray, Administer 2 sprays into each nostril once daily., Disp: , Rfl:     fluticasone propion-salmeteroL (Advair Diskus) 250-50 mcg/dose diskus inhaler, Inhale 1 puff 2 times a day., Disp: , Rfl:     levothyroxine (Synthroid, Levoxyl) 200 mcg tablet, take 1 tablet by mouth daily, Disp: 30 tablet, Rfl: 0    metFORMIN  mg 24 hr tablet, Take 1 tablet (500 mg) by mouth 2 times a day., Disp: , Rfl:     OneTouch Ultra Test strip, once daily., Disp: , Rfl:     OneTouch Ultra Test strip, 1 strip by Does not apply route see administration instructions., Disp: , Rfl:     pantoprazole  (ProtoNix) 40 mg EC tablet, Take 1 tablet (40 mg) by mouth once daily in the morning. Take before meals., Disp: , Rfl:     pioglitazone (Actos) 15 mg tablet, 1 tablet Orally Once a day, stop if swelling for 90 days, Disp: , Rfl:     tiZANidine (Zanaflex) 4 mg tablet, Take 1 tablet (4 mg) by mouth every 6 hours if needed for muscle spasms., Disp: , Rfl:     blood sugar diagnostic (ONETOUCH ULTRA BLUE TEST STRIP MISC), Instructions: use one strip daily and as needed if symptomatic; Dx E11.9, Disp: , Rfl:     gabapentin (Neurontin) 300 mg capsule, Take 1 capsule (300 mg) by mouth once daily., Disp: , Rfl:     HYDROcodone-acetaminophen (Norco) 5-325 mg tablet, Take 1 tablet by mouth every 6 hours if needed., Disp: , Rfl:     ibuprofen 200 mg tablet, Take 1 tablet (200 mg) by mouth every 6 hours if needed., Disp: , Rfl:     indomethacin (Indocin) 50 mg capsule, Take 1 capsule (50 mg) by mouth 2 times a day with meals., Disp: , Rfl:     levocetirizine (Xyzal) 5 mg tablet, Take 1 tablet (5 mg) by mouth once daily in the evening., Disp: , Rfl:     mupirocin (Bactroban) 2 % ointment, Apply 1 Application topically 2 times a day., Disp: , Rfl:     polyethylene glycol-electrolytes 420 gram solution, Take 4,000 mL by mouth 1 time., Disp: , Rfl:     traZODone (Desyrel) 100 mg tablet, Take 1 tablet (100 mg) by mouth once daily at bedtime., Disp: , Rfl:       Allergies as of 02/05/2024 - Reviewed 02/05/2024   Allergen Reaction Noted    Nitroglycerin Other 09/06/2023    Oxycodone-acetaminophen Hives and Rash 09/06/2023    Nitroglycerin Unknown 10/24/2023    Oxycodone-acetaminophen Hallucinations and Unknown 10/24/2023         Review of Systems   Cardiology: Lightheadedness-denies.  Chest pain-denies.  Leg edema-denies.  Palpitations-denies.  Respiratory: Cough-denies. Shortness of breath-denies.  Wheezing-denies.  Gastroenterology: Constipation-denies.  Diarrhea-denies.  Heartburn-denies.  Endocrinology: Cold intolerance-denies.   Heat intolerance-denies.  Sweats-denies.  Neurology: Headache-denies.  Tremor-denies.  Neuropathy in extremities-denies.  Psychology: Low energy-denies.  Irritability-denies.  Sleep disturbances-denies.      /84 (BP Location: Left arm)   Pulse 90   Wt 106 kg (233 lb)   BMI 33.43 kg/m²       Labs:  Lab Results   Component Value Date    WBC 6.3 09/05/2023    NRBC 0 09/05/2023    RBC 4.74 09/05/2023    HGB 14.1 09/05/2023    HCT 41.1 09/05/2023     09/05/2023     Lab Results   Component Value Date    CALCIUM 9.1 09/05/2023    AST 29 08/29/2022    ALKPHOS 52 08/29/2022    BILITOT 1.1 08/29/2022    PROT 7.5 08/29/2022    ALBUMIN 4.5 08/29/2022    GLOB 3.0 08/29/2022    AGR 1.5 08/29/2022     09/05/2023    K 4.0 09/05/2023     09/05/2023    CO2 24 09/05/2023    ANIONGAP 12 09/05/2023    BUN 15 09/05/2023    CREATININE 0.9 09/05/2023    UREACREAUR 16.7 09/05/2023    GLUCOSE 102 (H) 09/05/2023    ALT 33 08/29/2022    EGFR 91 09/05/2023     Lab Results   Component Value Date    CHOL 116 (L) 09/04/2023    TRIG 68 09/04/2023    HDL 38 (L) 09/04/2023    LDLCALC 64 (L) 09/04/2023     Lab Results   Component Value Date    MICROALBCREA 11.7 05/21/2022     Lab Results   Component Value Date    TSH 0.62 09/28/2023     Lab Results   Component Value Date    [id number] 650 05/21/2022     Lab Results   Component Value Date    HGBA1C 5.3 02/05/2024         Physical Exam   General Appearance: pleasant, cooperative, no acute distress  HEENT: no chemosis, no proptosis, no lid lag, no lid retraction  Neck: no lymphadenopathy, no thyromegaly, no dominant thyroid nodules  Heart: no murmurs, regular rate and rhythm, S1 and S2  Lungs: no wheezes, no rhonci, no rales  Extremities: no lower extremity swelling  Foot exam: 2+ dp pulses, intact to monofilament, vibratory sense absent, + calluses, + hammer toes      Assessment/Plan   1. Type 2 diabetes mellitus with hyperglycemia, without long-term current use of insulin  (CMS/[facility])  -A1c ordered and reviewed  -glycemic values reviewed  -labs reviewed  -refills today    -doing well on trulicity, try holding actos 15mg  -if sugars going in the 150 range go back on pioglitazone  -b12 and urine microalbumin added    2. Mixed hyperlipidemia  -on statin and tolerating, at ldl target <70, no change    3. Autoimmune thyroiditis  -euthyroid on therapy, no change         Follow Up:  Lino Lowery 6 months (look into trulicity assistance or changing meds), then likely susan Huggins 6 months after that    Medical Decision Making  Complexity of problem: Chronic illness of diabetes mellitus uncontrolled, progressing  Data analyzed and reviewed: Reviewed prior notes, blood glucose data, labs including HgbA1c, lipids, serum chemistries.  Ordered tests.   Risk of complications and morbidities: Prescription medications reviewed and modifications made.  Compliance assessed.  Addressed social determinants of health including food insecurity.

## 2024-02-05 ENCOUNTER — OFFICE VISIT (OUTPATIENT)
Dept: ENDOCRINOLOGY | Facility: CLINIC | Age: 74
End: 2024-02-05
Payer: MEDICARE

## 2024-02-05 VITALS
WEIGHT: 233 LBS | BODY MASS INDEX: 33.43 KG/M2 | DIASTOLIC BLOOD PRESSURE: 84 MMHG | SYSTOLIC BLOOD PRESSURE: 114 MMHG | HEART RATE: 90 BPM

## 2024-02-05 DIAGNOSIS — E06.3 AUTOIMMUNE THYROIDITIS: ICD-10-CM

## 2024-02-05 DIAGNOSIS — E78.2 MIXED HYPERLIPIDEMIA: ICD-10-CM

## 2024-02-05 DIAGNOSIS — E11.65 TYPE 2 DIABETES MELLITUS WITH HYPERGLYCEMIA, WITHOUT LONG-TERM CURRENT USE OF INSULIN (MULTI): Primary | ICD-10-CM

## 2024-02-05 LAB — POC HEMOGLOBIN A1C: 5.3 % (ref 4.2–6.5)

## 2024-02-05 PROCEDURE — 3008F BODY MASS INDEX DOCD: CPT | Performed by: INTERNAL MEDICINE

## 2024-02-05 PROCEDURE — 99214 OFFICE O/P EST MOD 30 MIN: CPT | Performed by: INTERNAL MEDICINE

## 2024-02-05 PROCEDURE — 1159F MED LIST DOCD IN RCRD: CPT | Performed by: INTERNAL MEDICINE

## 2024-02-05 PROCEDURE — 3074F SYST BP LT 130 MM HG: CPT | Performed by: INTERNAL MEDICINE

## 2024-02-05 PROCEDURE — 83036 HEMOGLOBIN GLYCOSYLATED A1C: CPT | Performed by: INTERNAL MEDICINE

## 2024-02-05 PROCEDURE — 3079F DIAST BP 80-89 MM HG: CPT | Performed by: INTERNAL MEDICINE

## 2024-02-05 PROCEDURE — 1036F TOBACCO NON-USER: CPT | Performed by: INTERNAL MEDICINE

## 2024-02-05 PROCEDURE — 1126F AMNT PAIN NOTED NONE PRSNT: CPT | Performed by: INTERNAL MEDICINE

## 2024-02-05 RX ORDER — ROSUVASTATIN CALCIUM 10 MG/1
10 TABLET, COATED ORAL DAILY
Qty: 90 TABLET | Refills: 1 | Status: SHIPPED | OUTPATIENT
Start: 2024-02-05

## 2024-02-05 RX ORDER — METFORMIN HYDROCHLORIDE 500 MG/1
500 TABLET, EXTENDED RELEASE ORAL 2 TIMES DAILY
Qty: 180 TABLET | Refills: 1 | Status: SHIPPED | OUTPATIENT
Start: 2024-02-05

## 2024-02-05 RX ORDER — LEVOTHYROXINE SODIUM 200 UG/1
200 TABLET ORAL
Qty: 90 TABLET | Refills: 1 | Status: SHIPPED | OUTPATIENT
Start: 2024-02-05 | End: 2025-02-04

## 2024-02-05 ASSESSMENT — PATIENT HEALTH QUESTIONNAIRE - PHQ9
SUM OF ALL RESPONSES TO PHQ9 QUESTIONS 1 AND 2: 0
1. LITTLE INTEREST OR PLEASURE IN DOING THINGS: NOT AT ALL
2. FEELING DOWN, DEPRESSED OR HOPELESS: NOT AT ALL

## 2024-02-05 ASSESSMENT — ENCOUNTER SYMPTOMS: DEPRESSION: 0

## 2024-02-05 ASSESSMENT — PAIN SCALES - GENERAL: PAINLEVEL: 0-NO PAIN

## 2024-02-16 ENCOUNTER — TELEPHONE (OUTPATIENT)
Dept: PHARMACY | Facility: HOSPITAL | Age: 74
End: 2024-02-16
Payer: MEDICARE

## 2024-02-16 NOTE — TELEPHONE ENCOUNTER
Population Health: Outreach by Ambulatory Pharmacy Team    Payor: Dahiana PLATA  Reason: Adherence  Medication: Metformin 500mg, Rosuvastatin 10mg  Outcome: Left Voicemail - patient returned call, endorses adherence to medications and no refills needed    Bianca Brand, PharmD

## 2024-03-05 DIAGNOSIS — K21.9 CHRONIC GERD: ICD-10-CM

## 2024-03-05 RX ORDER — PANTOPRAZOLE SODIUM 40 MG/1
40 TABLET, DELAYED RELEASE ORAL
Qty: 90 TABLET | Refills: 3 | Status: SHIPPED | OUTPATIENT
Start: 2024-03-05

## 2024-03-27 ENCOUNTER — TELEPHONE (OUTPATIENT)
Dept: PHARMACY | Facility: HOSPITAL | Age: 74
End: 2024-03-27
Payer: MEDICARE

## 2024-03-27 NOTE — TELEPHONE ENCOUNTER
Population Health: Outreach by Ambulatory Pharmacy Team    Payor: Dahiana PLATA  Reason: Adherence  Medication: Losartan-hydrochlorothiazide 50-12.5mg  Outcome: Patient Reached: Claims Adherence, no refills needed, tolerating well.    Cristal Carballo RP

## 2024-04-24 ENCOUNTER — OFFICE VISIT (OUTPATIENT)
Dept: PRIMARY CARE | Facility: CLINIC | Age: 74
End: 2024-04-24
Payer: MEDICARE

## 2024-04-24 VITALS
BODY MASS INDEX: 32.69 KG/M2 | OXYGEN SATURATION: 96 % | HEART RATE: 80 BPM | WEIGHT: 227.8 LBS | TEMPERATURE: 98.5 F | DIASTOLIC BLOOD PRESSURE: 72 MMHG | SYSTOLIC BLOOD PRESSURE: 140 MMHG

## 2024-04-24 DIAGNOSIS — N39.0 ACUTE UTI: Primary | ICD-10-CM

## 2024-04-24 DIAGNOSIS — R33.9 URINARY RETENTION: ICD-10-CM

## 2024-04-24 PROBLEM — R06.2 WHEEZING: Status: RESOLVED | Noted: 2022-06-24 | Resolved: 2024-04-24

## 2024-04-24 PROBLEM — H66.93 BOM (BILATERAL OTITIS MEDIA): Status: RESOLVED | Noted: 2019-10-17 | Resolved: 2024-04-24

## 2024-04-24 PROBLEM — E11.65 TYPE 2 DIABETES MELLITUS WITH HYPERGLYCEMIA, WITHOUT LONG-TERM CURRENT USE OF INSULIN (MULTI): Status: ACTIVE | Noted: 2021-03-16

## 2024-04-24 LAB
POC APPEARANCE, URINE: ABNORMAL
POC BILIRUBIN, URINE: NEGATIVE
POC BLOOD, URINE: ABNORMAL
POC COLOR, URINE: ABNORMAL
POC GLUCOSE, URINE: NEGATIVE MG/DL
POC KETONES, URINE: NEGATIVE MG/DL
POC LEUKOCYTES, URINE: ABNORMAL
POC NITRITE,URINE: POSITIVE
POC PH, URINE: 6 PH
POC PROTEIN, URINE: ABNORMAL MG/DL
POC SPECIFIC GRAVITY, URINE: 1.02
POC UROBILINOGEN, URINE: 0.2 EU/DL

## 2024-04-24 PROCEDURE — 99214 OFFICE O/P EST MOD 30 MIN: CPT | Performed by: INTERNAL MEDICINE

## 2024-04-24 PROCEDURE — 1126F AMNT PAIN NOTED NONE PRSNT: CPT | Performed by: INTERNAL MEDICINE

## 2024-04-24 PROCEDURE — 1036F TOBACCO NON-USER: CPT | Performed by: INTERNAL MEDICINE

## 2024-04-24 PROCEDURE — 81002 URINALYSIS NONAUTO W/O SCOPE: CPT | Performed by: INTERNAL MEDICINE

## 2024-04-24 PROCEDURE — 3078F DIAST BP <80 MM HG: CPT | Performed by: INTERNAL MEDICINE

## 2024-04-24 PROCEDURE — 1159F MED LIST DOCD IN RCRD: CPT | Performed by: INTERNAL MEDICINE

## 2024-04-24 PROCEDURE — 1157F ADVNC CARE PLAN IN RCRD: CPT | Performed by: INTERNAL MEDICINE

## 2024-04-24 PROCEDURE — 3077F SYST BP >= 140 MM HG: CPT | Performed by: INTERNAL MEDICINE

## 2024-04-24 RX ORDER — TAMSULOSIN HYDROCHLORIDE 0.4 MG/1
0.4 CAPSULE ORAL DAILY
Qty: 10 CAPSULE | Refills: 0 | Status: SHIPPED | OUTPATIENT
Start: 2024-04-24 | End: 2024-05-04

## 2024-04-24 RX ORDER — CIPROFLOXACIN 500 MG/1
500 TABLET ORAL 2 TIMES DAILY
Qty: 20 TABLET | Refills: 0 | Status: SHIPPED | OUTPATIENT
Start: 2024-04-24 | End: 2024-05-04

## 2024-04-24 ASSESSMENT — PATIENT HEALTH QUESTIONNAIRE - PHQ9
1. LITTLE INTEREST OR PLEASURE IN DOING THINGS: NOT AT ALL
2. FEELING DOWN, DEPRESSED OR HOPELESS: NOT AT ALL
SUM OF ALL RESPONSES TO PHQ9 QUESTIONS 1 AND 2: 0

## 2024-04-24 ASSESSMENT — PAIN SCALES - GENERAL: PAINLEVEL: 0-NO PAIN

## 2024-04-24 ASSESSMENT — ENCOUNTER SYMPTOMS
LOSS OF SENSATION IN FEET: 0
HEMATURIA: 0
FREQUENCY: 1
OCCASIONAL FEELINGS OF UNSTEADINESS: 0
DEPRESSION: 0
CHILLS: 1

## 2024-04-24 NOTE — PROGRESS NOTES
Subjective   Patient ID: Manfred Espinosa is a 73 y.o. male who presents for Urinary Retention.    UTI   This is a new problem. Episode onset: 3 days. The problem occurs every urination. The problem has been gradually worsening. The quality of the pain is described as burning (feels full). There has been no fever. Associated symptoms include chills, frequency (but sometimes can't go, other times fine), hesitancy and urgency. Pertinent negatives include no hematuria. He has tried increased fluids for the symptoms. The treatment provided no relief. There is no history of recurrent UTIs or a urological procedure.        Review of Systems   Constitutional:  Positive for chills.   Genitourinary:  Positive for frequency (but sometimes can't go, other times fine), hesitancy and urgency. Negative for hematuria.       Objective   /72 (BP Location: Left arm, Patient Position: Sitting)   Pulse 80   Temp 36.9 °C (98.5 °F)   Wt 103 kg (227 lb 12.8 oz)   SpO2 96%   BMI 32.69 kg/m²     Physical Exam  Abdominal:      General: There is no distension.      Palpations: Abdomen is soft.      Tenderness: There is no abdominal tenderness. There is no right CVA tenderness or left CVA tenderness.         Assessment/Plan   Diagnoses and all orders for this visit:  Acute UTI  -     ciprofloxacin (Cipro) 500 mg tablet; Take 1 tablet (500 mg) by mouth 2 times a day for 10 days.  -     POCT UA (nonautomated) manually resulted  Urinary retention  -     tamsulosin (Flomax) 0.4 mg 24 hr capsule; Take 1 capsule (0.4 mg) by mouth once daily for 10 days.  -     POCT UA (nonautomated) manually resulted    Strongly advised if can't urinate he needs to go to ER because he will need a catherization

## 2024-05-07 ENCOUNTER — APPOINTMENT (OUTPATIENT)
Dept: CARDIOLOGY | Facility: CLINIC | Age: 74
End: 2024-05-07
Payer: MEDICARE

## 2024-06-24 ENCOUNTER — OFFICE VISIT (OUTPATIENT)
Dept: PRIMARY CARE | Facility: CLINIC | Age: 74
End: 2024-06-24
Payer: MEDICARE

## 2024-06-24 VITALS
SYSTOLIC BLOOD PRESSURE: 126 MMHG | OXYGEN SATURATION: 96 % | HEART RATE: 66 BPM | BODY MASS INDEX: 32.83 KG/M2 | DIASTOLIC BLOOD PRESSURE: 62 MMHG | WEIGHT: 228.8 LBS

## 2024-06-24 DIAGNOSIS — M54.16 LUMBAR RADICULOPATHY: ICD-10-CM

## 2024-06-24 DIAGNOSIS — E06.3 AUTOIMMUNE THYROIDITIS: ICD-10-CM

## 2024-06-24 DIAGNOSIS — E53.8 VITAMIN B 12 DEFICIENCY: ICD-10-CM

## 2024-06-24 DIAGNOSIS — E55.9 VITAMIN D DEFICIENCY: ICD-10-CM

## 2024-06-24 DIAGNOSIS — E03.9 HYPOTHYROIDISM, UNSPECIFIED TYPE: ICD-10-CM

## 2024-06-24 DIAGNOSIS — G47.33 OBSTRUCTIVE SLEEP APNEA, ADULT: ICD-10-CM

## 2024-06-24 DIAGNOSIS — F51.01 PRIMARY INSOMNIA: ICD-10-CM

## 2024-06-24 DIAGNOSIS — K21.9 CHRONIC GERD: ICD-10-CM

## 2024-06-24 DIAGNOSIS — J31.0 CHRONIC RHINITIS: ICD-10-CM

## 2024-06-24 DIAGNOSIS — E66.01 MORBID OBESITY (MULTI): ICD-10-CM

## 2024-06-24 DIAGNOSIS — Z96.651 HISTORY OF TOTAL RIGHT KNEE REPLACEMENT: ICD-10-CM

## 2024-06-24 DIAGNOSIS — E11.65 TYPE 2 DIABETES MELLITUS WITH HYPERGLYCEMIA, WITHOUT LONG-TERM CURRENT USE OF INSULIN (MULTI): Primary | ICD-10-CM

## 2024-06-24 PROBLEM — T14.8XXA SUPERFICIAL INJURY: Status: RESOLVED | Noted: 2023-09-06 | Resolved: 2024-06-24

## 2024-06-24 LAB — POC HEMOGLOBIN A1C: 5.6 % (ref 4.2–6.5)

## 2024-06-24 PROCEDURE — 3078F DIAST BP <80 MM HG: CPT | Performed by: INTERNAL MEDICINE

## 2024-06-24 PROCEDURE — 3074F SYST BP LT 130 MM HG: CPT | Performed by: INTERNAL MEDICINE

## 2024-06-24 PROCEDURE — 1036F TOBACCO NON-USER: CPT | Performed by: INTERNAL MEDICINE

## 2024-06-24 PROCEDURE — 83036 HEMOGLOBIN GLYCOSYLATED A1C: CPT | Mod: QW,91 | Performed by: INTERNAL MEDICINE

## 2024-06-24 PROCEDURE — 99214 OFFICE O/P EST MOD 30 MIN: CPT | Performed by: INTERNAL MEDICINE

## 2024-06-24 PROCEDURE — 1157F ADVNC CARE PLAN IN RCRD: CPT | Performed by: INTERNAL MEDICINE

## 2024-06-24 PROCEDURE — 1126F AMNT PAIN NOTED NONE PRSNT: CPT | Performed by: INTERNAL MEDICINE

## 2024-06-24 PROCEDURE — 1159F MED LIST DOCD IN RCRD: CPT | Performed by: INTERNAL MEDICINE

## 2024-06-24 ASSESSMENT — ENCOUNTER SYMPTOMS
CONSTIPATION: 0
DIZZINESS: 1
PALPITATIONS: 0
ABDOMINAL PAIN: 0
CONSTITUTIONAL NEGATIVE: 1
ACTIVITY CHANGE: 0
DIARRHEA: 0
CARDIOVASCULAR NEGATIVE: 1
OCCASIONAL FEELINGS OF UNSTEADINESS: 0
ARTHRALGIAS: 1
PSYCHIATRIC NEGATIVE: 1
LOSS OF SENSATION IN FEET: 0
DEPRESSION: 0
SHORTNESS OF BREATH: 0
DYSURIA: 0
COUGH: 0

## 2024-06-24 ASSESSMENT — PATIENT HEALTH QUESTIONNAIRE - PHQ9
SUM OF ALL RESPONSES TO PHQ9 QUESTIONS 1 AND 2: 0
2. FEELING DOWN, DEPRESSED OR HOPELESS: NOT AT ALL
1. LITTLE INTEREST OR PLEASURE IN DOING THINGS: NOT AT ALL

## 2024-06-24 ASSESSMENT — PAIN SCALES - GENERAL: PAINLEVEL: 0-NO PAIN

## 2024-06-24 NOTE — PROGRESS NOTES
Subjective   Patient ID: Manfred Espinosa is a 73 y.o. male who presents for 2 month follow up.    NIDDM-managed by Dr Tracy--am BS running .Lab Results       Component                Value               Date                       HGBA1C                   5.3                 02/05/2024                                              5.6                 6/24/2024         Hypothyroid s/p autoimmune thyroidisitis-Lab Results       Component                Value               Date                       TSH                      0.62                09/28/2023               Chronic GERD-taking protonix with no symptoms-would like to try weaning off-agree fine to try    Sleep apnea-diagnosed but not using CPAP    Exercise-caring for house and yard               Diet-much less appetite    Having dizzy spells last week-last few seconds-occurring after coming inside from outside--thinks drinking enough water and gatorade. Didn't check BS. Was working outside in morning  for couple hours--soaked in sweat when done. Was out in the 80-90 with high humidity though not in sun. Only 1 dizzy spell yesterday-was standing and talking to wife, none today. Didn't check BS or BP. Discussed could be low BS or dehydration-suggest not working for hours at those temps.          Review of Systems   Constitutional: Negative.  Negative for activity change.   Respiratory:  Negative for cough and shortness of breath.    Cardiovascular: Negative.  Negative for chest pain, palpitations and leg swelling.   Gastrointestinal:  Negative for abdominal pain, constipation and diarrhea.   Genitourinary:  Negative for dysuria.   Musculoskeletal:  Positive for arthralgias.   Skin:  Negative for rash.   Neurological:  Positive for dizziness.   Psychiatric/Behavioral: Negative.         Objective   /62 (BP Location: Left arm, Patient Position: Sitting)   Pulse 66   Wt 104 kg (228 lb 12.8 oz)   SpO2 96%   BMI 32.83 kg/m²     Physical Exam  Vitals  reviewed.   Constitutional:       General: He is not in acute distress.     Appearance: Normal appearance.   Cardiovascular:      Rate and Rhythm: Normal rate and regular rhythm.      Heart sounds: Normal heart sounds. No murmur heard.     No gallop.   Pulmonary:      Breath sounds: Normal breath sounds. No wheezing, rhonchi or rales.   Skin:     General: Skin is warm and dry.      Findings: No rash.   Neurological:      General: No focal deficit present.      Mental Status: He is alert and oriented to person, place, and time.   Psychiatric:         Mood and Affect: Mood normal.         Assessment/Plan  will stop pioglitazone and monitor BS-would prefer not to have am BS <100  Diagnoses and all orders for this visit:  Type 2 diabetes mellitus with hyperglycemia, without long-term current use of insulin (Multi)  -     POCT glycosylated hemoglobin (Hb A1C) manually resulted  Chronic rhinitis  Autoimmune thyroiditis  Hypothyroidism, unspecified type  Morbid obesity (Multi)  Vitamin B 12 deficiency  Vitamin D deficiency  Chronic GERD  History of total right knee replacement  Lumbar radiculopathy  Primary insomnia  Obstructive sleep apnea, adult  Other orders  -     Follow Up In Primary Care - Established; Future         Current Outpatient Medications:     aspirin 81 mg EC tablet, Take 1 tablet (81 mg) by mouth once daily., Disp: , Rfl:     blood sugar diagnostic (ONETOUCH ULTRA BLUE TEST STRIP MISC), Instructions: use one strip daily and as needed if symptomatic; Dx E11.9, Disp: , Rfl:     cholecalciferol (Vitamin D-3) 5,000 Units tablet, Take by mouth., Disp: , Rfl:     dulaglutide (Trulicity) 1.5 mg/0.5 mL pen injector injection, INJECT CONTENTS OF ONE PEN, (1.5MG), UNDER THE SKIN WEEKLY for 28, Disp: , Rfl:     fluticasone (Flonase Allergy Relief) 50 mcg/actuation nasal spray, Administer 2 sprays into each nostril once daily., Disp: , Rfl:     ibuprofen 200 mg tablet, Take 1 tablet (200 mg) by mouth every 6 hours if  needed., Disp: , Rfl:     metFORMIN  mg 24 hr tablet, Take 1 tablet (500 mg) by mouth 2 times a day., Disp: 180 tablet, Rfl: 1    OneTouch Ultra Test strip, 1 strip by Does not apply route see administration instructions., Disp: , Rfl:     pantoprazole (ProtoNix) 40 mg EC tablet, Take 1 tablet (40 mg) by mouth once daily in the morning. Take before meals., Disp: 90 tablet, Rfl: 3    rosuvastatin (Crestor) 10 mg tablet, Take 1 tablet (10 mg) by mouth once daily. For 90 days, Disp: 90 tablet, Rfl: 1    tiZANidine (Zanaflex) 4 mg tablet, Take 1 tablet (4 mg) by mouth every 6 hours if needed for muscle spasms., Disp: , Rfl:     Unithroid 200 mcg tablet, Take 1 tablet (200 mcg) by mouth once daily in the morning. Take before meals., Disp: 90 tablet, Rfl: 1

## 2024-07-18 DIAGNOSIS — E11.65 TYPE 2 DIABETES MELLITUS WITH HYPERGLYCEMIA, WITHOUT LONG-TERM CURRENT USE OF INSULIN (MULTI): Primary | ICD-10-CM

## 2024-07-18 DIAGNOSIS — E11.65 TYPE 2 DIABETES MELLITUS WITH HYPERGLYCEMIA, WITHOUT LONG-TERM CURRENT USE OF INSULIN (MULTI): ICD-10-CM

## 2024-07-18 RX ORDER — DULAGLUTIDE 1.5 MG/.5ML
1.5 INJECTION, SOLUTION SUBCUTANEOUS
Qty: 2 ML | Refills: 0 | Status: SHIPPED | OUTPATIENT
Start: 2024-07-21

## 2024-07-18 RX ORDER — DULAGLUTIDE 1.5 MG/.5ML
1.5 INJECTION, SOLUTION SUBCUTANEOUS
Qty: 2 ML | Refills: 0 | Status: SHIPPED | OUTPATIENT
Start: 2024-07-21 | End: 2024-07-18 | Stop reason: SDUPTHER

## 2024-08-01 ENCOUNTER — TELEPHONE (OUTPATIENT)
Dept: ENDOCRINOLOGY | Facility: CLINIC | Age: 74
End: 2024-08-01
Payer: MEDICARE

## 2024-08-01 NOTE — TELEPHONE ENCOUNTER
Patient returned phone call and confirmed appointment time change will work just fine.     Thank you   Ida

## 2024-08-01 NOTE — TELEPHONE ENCOUNTER
Manfred Espinosa   1950   [id number]   [phone]       Called patient and left voice message in regards o changing appt time from 8/5/24 at 10:15am to 2pm due to scheduling error. Patient advised to call office to confirm change.

## 2024-08-05 ENCOUNTER — APPOINTMENT (OUTPATIENT)
Dept: ENDOCRINOLOGY | Facility: CLINIC | Age: 74
End: 2024-08-05
Payer: MEDICARE

## 2024-08-05 ENCOUNTER — TELEPHONE (OUTPATIENT)
Dept: ENDOCRINOLOGY | Facility: CLINIC | Age: 74
End: 2024-08-05

## 2024-08-05 NOTE — TELEPHONE ENCOUNTER
Manfred ALSTON [facility]   1950   [id number]   [phone]       Called patient and left voice message in regards to rescheduling patient appt due to provider has an urgent matter and will not be in the office. Patient was rescheduled with Clinical Pharmacist for 8/6/24.

## 2024-08-06 ENCOUNTER — TELEMEDICINE (OUTPATIENT)
Dept: PHARMACY | Facility: HOSPITAL | Age: 74
End: 2024-08-06
Payer: MEDICARE

## 2024-08-06 ENCOUNTER — CLINICAL SUPPORT (OUTPATIENT)
Dept: ENDOCRINOLOGY | Facility: CLINIC | Age: 74
End: 2024-08-06
Payer: MEDICARE

## 2024-08-06 VITALS
WEIGHT: 236.4 LBS | DIASTOLIC BLOOD PRESSURE: 63 MMHG | HEART RATE: 56 BPM | SYSTOLIC BLOOD PRESSURE: 132 MMHG | BODY MASS INDEX: 33.92 KG/M2

## 2024-08-06 DIAGNOSIS — E06.3 AUTOIMMUNE THYROIDITIS: ICD-10-CM

## 2024-08-06 DIAGNOSIS — E11.65 TYPE 2 DIABETES MELLITUS WITH HYPERGLYCEMIA, WITHOUT LONG-TERM CURRENT USE OF INSULIN (MULTI): ICD-10-CM

## 2024-08-06 DIAGNOSIS — E78.2 MIXED HYPERLIPIDEMIA: ICD-10-CM

## 2024-08-06 DIAGNOSIS — E11.65 TYPE 2 DIABETES MELLITUS WITH HYPERGLYCEMIA, WITHOUT LONG-TERM CURRENT USE OF INSULIN (MULTI): Primary | ICD-10-CM

## 2024-08-06 LAB — POC HEMOGLOBIN A1C: 5.8 % (ref 4.2–6.5)

## 2024-08-06 PROCEDURE — RXMED WILLOW AMBULATORY MEDICATION CHARGE

## 2024-08-06 PROCEDURE — 99213 OFFICE O/P EST LOW 20 MIN: CPT | Performed by: INTERNAL MEDICINE

## 2024-08-06 PROCEDURE — 83036 HEMOGLOBIN GLYCOSYLATED A1C: CPT | Performed by: INTERNAL MEDICINE

## 2024-08-06 RX ORDER — LEVOTHYROXINE SODIUM 200 UG/1
200 TABLET ORAL
Qty: 90 TABLET | Refills: 1 | Status: SHIPPED | OUTPATIENT
Start: 2024-08-06

## 2024-08-06 RX ORDER — ROSUVASTATIN CALCIUM 10 MG/1
10 TABLET, COATED ORAL DAILY
Qty: 90 TABLET | Refills: 1 | Status: SHIPPED | OUTPATIENT
Start: 2024-08-06

## 2024-08-06 RX ORDER — TIRZEPATIDE 5 MG/.5ML
5 INJECTION, SOLUTION SUBCUTANEOUS
Qty: 6 ML | Refills: 3 | Status: SHIPPED | OUTPATIENT
Start: 2024-08-06

## 2024-08-06 RX ORDER — TIRZEPATIDE 2.5 MG/.5ML
2.5 INJECTION, SOLUTION SUBCUTANEOUS
Qty: 2 ML | Refills: 0 | Status: SHIPPED | OUTPATIENT
Start: 2024-08-06

## 2024-08-06 NOTE — PROGRESS NOTES
Attestation signed by Raj Tracy MD on 8/6/24 at 3:09 PM.    I, Dr Raj Tracy, have reviewed this progress note, medication list, vital signs, any pertinent lab values, and any CGM data if present with the [facility] and  face to face during this visit today. This note reflects the treatment plan that was made under my direction after reviewing the above mentioned elements while face to face with the patient and CDE.  I personally answered and addressed any questions and concerns the patient had during the visit today.  The CDE entered the data in this note under my direction and I personally reviewed it, signed any lab or medication orders that I instructed to be completed. I am the billing provider for this visit and the level of service was determined by my involvement in the Medical Decision Making Component of this visit while face to face with the patient.

## 2024-08-06 NOTE — PATIENT INSTRUCTIONS
Begin Mounjaro 2.5mg once weekly for 4 total weeks.  Then,  increase to 5mg weekly thereafter     Continue Metformin twice daily as you have been for now    Go for fasting blood work before next visit

## 2024-08-06 NOTE — PROGRESS NOTES
HPI     74 yo with hashimoto's disease, dm2 (dx 2021), dyslipidemia, gerd.  Last A1c-5.6%, today 5.8%.            Pt has glucose meter, testing <1x/day, sugars 's at different times of day, no lows. Pt is doing better on carb control in the diet. Pt walks on a daily basis.          Taking metformin 500mg ER 1 pill twice per day (as much as he tolerates),  ran out trulicity 1.5mg approx. X3wks ago,  was getting via [employer].   -diomedes 15mg stopped last visit   -glimeperide 2mg stopped July 2023 visit           Taking crestor 10mg for lipids and tolerating.          Taking Unithroid 200mcg (yisel). Pt takes thyroid medication am and PPI qhs. Pt denies obstructive goiter sx, euthyroid by history.        Current Outpatient Medications:     blood sugar diagnostic (ONETOUCH ULTRA BLUE TEST STRIP MISC), Instructions: use one strip daily and as needed if symptomatic; Dx E11.9, Disp: , Rfl:     dulaglutide (Trulicity) 1.5 mg/0.5 mL pen injector injection, Inject 1.5 mg under the skin 1 (one) time per week., Disp: 2 mL, Rfl: 0    metFORMIN  mg 24 hr tablet, Take 1 tablet (500 mg) by mouth 2 times a day., Disp: 180 tablet, Rfl: 1    pantoprazole (ProtoNix) 40 mg EC tablet, Take 1 tablet (40 mg) by mouth once daily in the morning. Take before meals., Disp: 90 tablet, Rfl: 3    tiZANidine (Zanaflex) 4 mg tablet, Take 1 tablet (4 mg) by mouth every 6 hours if needed for muscle spasms., Disp: , Rfl:     rosuvastatin (Crestor) 10 mg tablet, Take 1 tablet (10 mg) by mouth once daily. For 90 days, Disp: 90 tablet, Rfl: 1    Unithroid 200 mcg tablet, Take 1 tablet (200 mcg) by mouth once daily in the morning. Take before meals., Disp: 90 tablet, Rfl: 1    Allergies as of 08/06/2024 - Reviewed 08/06/2024   Allergen Reaction Noted    Nitroglycerin Other 09/06/2023    Oxycodone-acetaminophen Hives and Rash 09/06/2023    Nitroglycerin Unknown 10/24/2023    Oxycodone-acetaminophen Hallucinations and Unknown 10/24/2023       BP  132/63   Pulse 56   Wt 107 kg (236 lb 6.4 oz)   BMI 33.92 kg/m²     Labs:   Lab Results   Component Value Date    WBC 6.3 09/05/2023    NRBC 0 09/05/2023    RBC 4.74 09/05/2023    HGB 14.1 09/05/2023    HCT 41.1 09/05/2023     09/05/2023     Lab Results   Component Value Date    CALCIUM 9.1 09/05/2023    AST 29 08/29/2022    ALKPHOS 52 08/29/2022    BILITOT 1.1 08/29/2022    PROT 7.5 08/29/2022    ALBUMIN 4.5 08/29/2022    GLOB 3.0 08/29/2022    AGR 1.5 08/29/2022     09/05/2023    K 4.0 09/05/2023     09/05/2023    CO2 24 09/05/2023    ANIONGAP 12 09/05/2023    BUN 15 09/05/2023    CREATININE 0.9 09/05/2023    UREACREAUR 16.7 09/05/2023    GLUCOSE 102 (H) 09/05/2023    ALT 33 08/29/2022    EGFR 91 09/05/2023     Lab Results   Component Value Date    CHOL 116 (L) 09/04/2023    TRIG 68 09/04/2023    HDL 38 (L) 09/04/2023    LDLCALC 64 (L) 09/04/2023     Lab Results   Component Value Date    MICROALBCREA 11.7 05/21/2022     Lab Results   Component Value Date    TSH 0.62 09/28/2023     Lab Results   Component Value Date    [id number] 650 05/21/2022     Lab Results   Component Value Date    HGBA1C 5.8 08/06/2024         Assessment/Plan   1. Type 2 diabetes mellitus with hyperglycemia, without long-term current use of insulin (Multi)    -A1c ordered and reviewed  -labs reviewed & ordered to repeat for next visit   -refills today    -will benefit from mounjaro via  PAP   -pending approval: start 2.5mg x4 weeks, then 5mg weekly thereafter        Patient Assistance Screening (VAF)  Patient verbally reports monthly or yearly income which is less than 400% federal poverty level.  Application for program would be submitted for the following medications: mounjaro 2.5/5mg  - pt to provide copy of most recent 1040 Form to start application process      2. Mixed hyperlipidemia  -on statin and tolerating, at ldl target <70, no change  -repeat fasting labs prior to next visit     3. Autoimmune  thyroiditis  -euthyroid on therapy, repeat labs      Follow up: 6mon bb    -labs/tests/notes reviewed  -reviewed and counseled patient on medication monitoring and side effects    Treatment and plan discussed with Dr. Tracy.  MILVIA Hayes, PharmD, BC-ADM, CDCES.

## 2024-08-06 NOTE — PROGRESS NOTES
[facility]     Manfred booth 73 y.o. patient was referred to the Clinical Pharmacy Team for diabetes management.  Presents today via telephone for initial assessment and management.      Referring Provider: Raj Tracy MD       Taking metformin 500mg ER 1 pill twice per day (as much as he tolerates),  ran out trulicity 1.5mg approx. X3wks ago,  was getting via [facility].   -diomedes 15mg stopped last visit   -glimeperide 2mg stopped July 2023 visit           Taking crestor 10mg for lipids and tolerating.          Taking Unithroid 200mcg (yisel). Pt takes thyroid medication am and PPI qhs. Pt denies obstructive goiter sx, euthyroid by history.        Current Outpatient Medications:     blood sugar diagnostic (ONETOUCH ULTRA BLUE TEST STRIP MISC), Instructions: use one strip daily and as needed if symptomatic; Dx E11.9, Disp: , Rfl:     dulaglutide (Trulicity) 1.5 mg/0.5 mL pen injector injection, Inject 1.5 mg under the skin 1 (one) time per week., Disp: 2 mL, Rfl: 0    metFORMIN  mg 24 hr tablet, Take 1 tablet (500 mg) by mouth 2 times a day., Disp: 180 tablet, Rfl: 1    pantoprazole (ProtoNix) 40 mg EC tablet, Take 1 tablet (40 mg) by mouth once daily in the morning. Take before meals., Disp: 90 tablet, Rfl: 3    rosuvastatin (Crestor) 10 mg tablet, Take 1 tablet (10 mg) by mouth once daily. For 90 days, Disp: 90 tablet, Rfl: 1    tiZANidine (Zanaflex) 4 mg tablet, Take 1 tablet (4 mg) by mouth every 6 hours if needed for muscle spasms., Disp: , Rfl:     Unithroid 200 mcg tablet, Take 1 tablet (200 mcg) by mouth once daily in the morning. Take before meals., Disp: 90 tablet, Rfl: 1     Allergies as of 08/06/2024 - Reviewed 08/06/2024   Allergen Reaction Noted    Nitroglycerin Other 09/06/2023    Oxycodone-acetaminophen Hives and Rash 09/06/2023    Nitroglycerin Unknown 10/24/2023    Oxycodone-acetaminophen Hallucinations and Unknown 10/24/2023     Lab Results   Component Value Date     HGBA1C 5.8 08/06/2024     Lab Results   Component Value Date    BILITOT 1.1 08/29/2022    CALCIUM 9.1 09/05/2023    CO2 24 09/05/2023     09/05/2023    CREATININE 0.9 09/05/2023    GLUCOSE 102 (H) 09/05/2023    ALKPHOS 52 08/29/2022    K 4.0 09/05/2023    PROT 7.5 08/29/2022     09/05/2023    AST 29 08/29/2022    ALT 33 08/29/2022    BUN 15 09/05/2023    ANIONGAP 12 09/05/2023    MG 2.0 12/15/2021    ALBUMIN 4.5 08/29/2022    EGFR 91 09/05/2023     Lab Results   Component Value Date    CHOL 116 (L) 09/04/2023    TRIG 68 09/04/2023    HDL 38 (L) 09/04/2023    LDLCALC 64 (L) 09/04/2023     Lab Results   Component Value Date    MICROALBCREA 11.7 05/21/2022        Patient Assistance Screening (VAF)  Patient verbally reports monthly or yearly income which is less than 400% federal poverty level.  Application for program has been submitted for the following medications: Mounjaro  Patient has been informed that program team will be reaching out to them to discuss necessary documentation, instructed to answer phone/return voicemail.   Patient aware this process may take up to 6 weeks.   If approved medication must be filled through [facility] pharmacy and may be picked up or mailed to patient.    PATIENT EDUCATION/DISCUSSION:  - Counseled patient on MOA, expectations, side effects, duration of therapy, contraindications, administration, and monitoring parameters  - Answered all patient questions and concerns    Assessment/Plan   1. Type 2 diabetes mellitus with hyperglycemia, without long-term current use of insulin (Multi)      -will benefit from mounjaro via  PAP   -pending approval: start 2.5mg x4 weeks, then 5mg weekly thereafter       Continue all meds under the continuation of care with the referring provider and clinical pharmacy team.  Prescription(s) sent to [facility] pharmacy for assistance on authorization and copay. Medication will be mailed to patient.    Follow up as scheduled with Dr. Tracy  & team in endocrinology office.  Follow up annually with clinical pharmacist for re-enrollment in [facility]    Thank you,   Beverley Hayes, PharmD, BC-ADM, CDCES     Verbal consent to manage patient's drug therapy was obtained from the patient and/or an individual authorized to act on behalf of a patient. They were informed they may decline to participate or withdraw from participation in pharmacy services at any time.       ADDENDUM 2/5/25:  -mounjaro incr to 7.5mg today's endo visit, new erx sent to [facility]

## 2024-08-07 ENCOUNTER — PHARMACY VISIT (OUTPATIENT)
Dept: PHARMACY | Facility: CLINIC | Age: 74
End: 2024-08-07
Payer: MEDICARE

## 2024-08-14 DIAGNOSIS — E11.65 TYPE 2 DIABETES MELLITUS WITH HYPERGLYCEMIA, WITHOUT LONG-TERM CURRENT USE OF INSULIN (MULTI): Primary | ICD-10-CM

## 2024-08-14 RX ORDER — PIOGLITAZONEHYDROCHLORIDE 15 MG/1
15 TABLET ORAL DAILY
Qty: 90 TABLET | Refills: 3 | Status: SHIPPED | OUTPATIENT
Start: 2024-08-14

## 2024-09-06 PROCEDURE — RXMED WILLOW AMBULATORY MEDICATION CHARGE

## 2024-09-09 ENCOUNTER — PHARMACY VISIT (OUTPATIENT)
Dept: PHARMACY | Facility: CLINIC | Age: 74
End: 2024-09-09
Payer: MEDICARE

## 2024-09-18 DIAGNOSIS — E11.65 TYPE 2 DIABETES MELLITUS WITH HYPERGLYCEMIA, WITHOUT LONG-TERM CURRENT USE OF INSULIN: ICD-10-CM

## 2024-09-18 RX ORDER — METFORMIN HYDROCHLORIDE 500 MG/1
TABLET, EXTENDED RELEASE ORAL
Qty: 180 TABLET | Refills: 1 | Status: SHIPPED | OUTPATIENT
Start: 2024-09-18

## 2024-10-28 ENCOUNTER — OFFICE VISIT (OUTPATIENT)
Dept: PRIMARY CARE | Facility: CLINIC | Age: 74
End: 2024-10-28
Payer: MEDICARE

## 2024-10-28 VITALS
BODY MASS INDEX: 33.3 KG/M2 | SYSTOLIC BLOOD PRESSURE: 134 MMHG | HEART RATE: 78 BPM | OXYGEN SATURATION: 95 % | DIASTOLIC BLOOD PRESSURE: 72 MMHG | WEIGHT: 232.1 LBS

## 2024-10-28 DIAGNOSIS — J20.9 ACUTE BRONCHITIS, UNSPECIFIED ORGANISM: Primary | ICD-10-CM

## 2024-10-28 PROCEDURE — 3078F DIAST BP <80 MM HG: CPT | Performed by: INTERNAL MEDICINE

## 2024-10-28 PROCEDURE — 99214 OFFICE O/P EST MOD 30 MIN: CPT | Performed by: INTERNAL MEDICINE

## 2024-10-28 PROCEDURE — 1126F AMNT PAIN NOTED NONE PRSNT: CPT | Performed by: INTERNAL MEDICINE

## 2024-10-28 PROCEDURE — 1157F ADVNC CARE PLAN IN RCRD: CPT | Performed by: INTERNAL MEDICINE

## 2024-10-28 PROCEDURE — 3075F SYST BP GE 130 - 139MM HG: CPT | Performed by: INTERNAL MEDICINE

## 2024-10-28 PROCEDURE — 1159F MED LIST DOCD IN RCRD: CPT | Performed by: INTERNAL MEDICINE

## 2024-10-28 PROCEDURE — 1036F TOBACCO NON-USER: CPT | Performed by: INTERNAL MEDICINE

## 2024-10-28 RX ORDER — BUDESONIDE AND FORMOTEROL FUMARATE DIHYDRATE 160; 4.5 UG/1; UG/1
2 AEROSOL RESPIRATORY (INHALATION)
Qty: 10.2 G | Refills: 0 | Status: SHIPPED | OUTPATIENT
Start: 2024-10-28 | End: 2025-10-28

## 2024-10-28 RX ORDER — AZITHROMYCIN 250 MG/1
TABLET, FILM COATED ORAL
Qty: 6 TABLET | Refills: 0 | Status: SHIPPED | OUTPATIENT
Start: 2024-10-28 | End: 2024-11-02

## 2024-10-28 RX ORDER — TAMSULOSIN HYDROCHLORIDE 0.4 MG/1
0.4 CAPSULE ORAL DAILY
COMMUNITY
Start: 2024-10-28

## 2024-10-28 RX ORDER — BENZONATATE 200 MG/1
200 CAPSULE ORAL 3 TIMES DAILY PRN
Qty: 42 CAPSULE | Refills: 0 | Status: SHIPPED | OUTPATIENT
Start: 2024-10-28 | End: 2024-11-27

## 2024-10-28 RX ORDER — SILDENAFIL 100 MG/1
100 TABLET, FILM COATED ORAL AS NEEDED
COMMUNITY
Start: 2024-10-28

## 2024-10-28 ASSESSMENT — ENCOUNTER SYMPTOMS
COUGH: 1
LOSS OF SENSATION IN FEET: 0
WHEEZING: 1
DEPRESSION: 0
OCCASIONAL FEELINGS OF UNSTEADINESS: 0
SINUS PAIN: 1
HEADACHES: 1
RHINORRHEA: 1

## 2024-10-28 ASSESSMENT — PAIN SCALES - GENERAL: PAINLEVEL_OUTOF10: 0-NO PAIN

## 2024-10-31 ENCOUNTER — TELEPHONE (OUTPATIENT)
Dept: PRIMARY CARE | Facility: CLINIC | Age: 74
End: 2024-10-31
Payer: MEDICARE

## 2024-10-31 DIAGNOSIS — J20.9 ACUTE BRONCHITIS, UNSPECIFIED ORGANISM: Primary | ICD-10-CM

## 2024-11-01 ENCOUNTER — HOSPITAL ENCOUNTER (OUTPATIENT)
Dept: RADIOLOGY | Facility: HOSPITAL | Age: 74
Discharge: HOME | End: 2024-11-01
Payer: MEDICARE

## 2024-11-01 DIAGNOSIS — J20.9 ACUTE BRONCHITIS, UNSPECIFIED ORGANISM: ICD-10-CM

## 2024-11-01 PROCEDURE — 71046 X-RAY EXAM CHEST 2 VIEWS: CPT

## 2024-11-07 PROCEDURE — RXMED WILLOW AMBULATORY MEDICATION CHARGE

## 2024-11-12 ENCOUNTER — PHARMACY VISIT (OUTPATIENT)
Dept: PHARMACY | Facility: CLINIC | Age: 74
End: 2024-11-12
Payer: MEDICARE

## 2024-11-19 ENCOUNTER — OFFICE VISIT (OUTPATIENT)
Dept: PRIMARY CARE | Facility: CLINIC | Age: 74
End: 2024-11-19
Payer: MEDICARE

## 2024-11-19 VITALS
DIASTOLIC BLOOD PRESSURE: 64 MMHG | BODY MASS INDEX: 33.52 KG/M2 | SYSTOLIC BLOOD PRESSURE: 146 MMHG | WEIGHT: 233.6 LBS | OXYGEN SATURATION: 98 % | HEART RATE: 76 BPM

## 2024-11-19 DIAGNOSIS — H65.01 NON-RECURRENT ACUTE SEROUS OTITIS MEDIA OF RIGHT EAR: Primary | ICD-10-CM

## 2024-11-19 PROCEDURE — 1159F MED LIST DOCD IN RCRD: CPT | Performed by: INTERNAL MEDICINE

## 2024-11-19 PROCEDURE — 1157F ADVNC CARE PLAN IN RCRD: CPT | Performed by: INTERNAL MEDICINE

## 2024-11-19 PROCEDURE — 99214 OFFICE O/P EST MOD 30 MIN: CPT | Performed by: INTERNAL MEDICINE

## 2024-11-19 PROCEDURE — 3077F SYST BP >= 140 MM HG: CPT | Performed by: INTERNAL MEDICINE

## 2024-11-19 PROCEDURE — 1036F TOBACCO NON-USER: CPT | Performed by: INTERNAL MEDICINE

## 2024-11-19 PROCEDURE — 3078F DIAST BP <80 MM HG: CPT | Performed by: INTERNAL MEDICINE

## 2024-11-19 PROCEDURE — 1126F AMNT PAIN NOTED NONE PRSNT: CPT | Performed by: INTERNAL MEDICINE

## 2024-11-19 RX ORDER — PREDNISONE 10 MG/1
TABLET ORAL
Qty: 10 TABLET | Refills: 0 | Status: SHIPPED | OUTPATIENT
Start: 2024-11-19 | End: 2024-11-26

## 2024-11-19 RX ORDER — AMOXICILLIN AND CLAVULANATE POTASSIUM 875; 125 MG/1; MG/1
875 TABLET, FILM COATED ORAL 2 TIMES DAILY
Qty: 20 TABLET | Refills: 0 | Status: SHIPPED | OUTPATIENT
Start: 2024-11-19 | End: 2024-11-29

## 2024-11-19 ASSESSMENT — ENCOUNTER SYMPTOMS
LOSS OF SENSATION IN FEET: 0
DEPRESSION: 0
OCCASIONAL FEELINGS OF UNSTEADINESS: 0

## 2024-11-19 ASSESSMENT — PAIN SCALES - GENERAL: PAINLEVEL_OUTOF10: 0-NO PAIN

## 2024-11-19 NOTE — PROGRESS NOTES
mSubjective   Patient ID: Manfred Espinosa is a 73 y.o. male who presents for URI / blocked ears.    Still can't breathe-heaviness with a cough-hard to catch breath. coughing with occasional production. Hearing wheezing when coughing. Using inhaler. No fever or chills. Ears feeling blocked. Completed ATB.      Neg CXR 2 weeks ago.                Objective   /64 (BP Location: Left arm, Patient Position: Sitting)   Pulse 76   Wt 106 kg (233 lb 9.6 oz)   SpO2 98%   BMI 33.52 kg/m²     Physical Exam  Constitutional:       General: He is not in acute distress.     Appearance: He is not ill-appearing.   HENT:      Ears:      Comments: R>>L red and bulging TM     Nose: Congestion present.      Mouth/Throat:      Pharynx: Oropharynx is clear.   Cardiovascular:      Rate and Rhythm: Normal rate and regular rhythm.   Pulmonary:      Breath sounds: No wheezing or rhonchi.      Comments: No cough spasms during visit  Chest:      Chest wall: No tenderness.   Lymphadenopathy:      Cervical: Cervical adenopathy present.         Assessment/Plan   Assessment & Plan  Non-recurrent acute serous otitis media of right ear    Orders:    amoxicillin-pot clavulanate (Augmentin) 875-125 mg tablet; Take 1 tablet (875 mg) by mouth 2 times a day for 10 days.    predniSONE (Deltasone) 10 mg tablet; Take 2 tablets (20 mg) by mouth once daily for 3 days, THEN 1 tablet (10 mg) once daily for 4 days.    Will start augment and prednisone-advised to eat yogurt-advised to call if not improving

## 2024-11-27 ENCOUNTER — OFFICE VISIT (OUTPATIENT)
Dept: OTOLARYNGOLOGY | Facility: CLINIC | Age: 74
End: 2024-11-27
Payer: MEDICARE

## 2024-11-27 ENCOUNTER — CLINICAL SUPPORT (OUTPATIENT)
Dept: AUDIOLOGY | Facility: CLINIC | Age: 74
End: 2024-11-27
Payer: MEDICARE

## 2024-11-27 DIAGNOSIS — H69.91 DISORDER OF RIGHT EUSTACHIAN TUBE: ICD-10-CM

## 2024-11-27 DIAGNOSIS — H69.92 DYSFUNCTION OF LEFT EUSTACHIAN TUBE: Primary | ICD-10-CM

## 2024-11-27 DIAGNOSIS — H65.92 LEFT OTITIS MEDIA WITH EFFUSION: Primary | ICD-10-CM

## 2024-11-27 PROCEDURE — 1157F ADVNC CARE PLAN IN RCRD: CPT | Performed by: OTOLARYNGOLOGY

## 2024-11-27 PROCEDURE — 99213 OFFICE O/P EST LOW 20 MIN: CPT | Performed by: OTOLARYNGOLOGY

## 2024-11-27 PROCEDURE — 1036F TOBACCO NON-USER: CPT | Performed by: OTOLARYNGOLOGY

## 2024-11-27 PROCEDURE — 69420 INCISION OF EARDRUM: CPT | Performed by: OTOLARYNGOLOGY

## 2024-11-27 PROCEDURE — 92567 TYMPANOMETRY: CPT | Performed by: AUDIOLOGIST

## 2024-11-27 PROCEDURE — 1159F MED LIST DOCD IN RCRD: CPT | Performed by: OTOLARYNGOLOGY

## 2024-11-27 NOTE — PROGRESS NOTES
TYMPANOMETRY ONLY      RIGHT TYMPANOGRAM :  Type A, normal tympanic membrane mobility with normal middle ear pressure , COULD NOT MAINTAIN SEAL.    LEFT TYMPANOGRAM : Type B, reduced tympanic membrane mobility , NO REFLEXES MEASURED

## 2024-11-27 NOTE — PROGRESS NOTES
Chief Complaint     Ear infections     History of Present Illness    11.27.2024: He has decreased hearing like his ears are plugged for more than 2 weeks. He has tried oral generic augmentin and prednisone but could not find relief. Left side is worse.    On examination, TMs look intact .Possible effusion at left.  Tympanogram shows type B at left. Today he had left needle myringotomy and right IT steroid injection for right ETD.    Plan:  1- follow up in 2 weeks with hearing test  _________________________________________________________________    05.17.2023: He comes for postop follow up. Doing good. Feels 100% better. On examination, there was minor crusting over right anterior part of nasal septum.     Recommendation:  1- continue nasal rinse and ointment  2- follow up in 2 months     ____________________________________________________________________________________________     Mr. Espinosa is a 71 yo M. He cannot breath well from his nose for years. It is worse at his right side.   He has tried nasal steroid spray for months, could nor find relief.     On examination, nasal septum deviated to right, right nasal passage looks tight. Dryness crusting in left nasal cavity (+)     Dx:  1- Deviated nasal septum     Plan:  1- CT sinus for surgical planning  2- septoplasty, turbinoplasty      Review of Systems  Please see scanned review of systems document in the chart.  ENMT: rhinorrhea,~sinus pressure,~nasal blockage/obstruction~and~snoring.   Respiratory: shortness of breath.   Neurological: restless legs syndrome.         Physical Exam  General appearance: Healthy-appearing, well-nourished, well groomed, in no acute distress.      Head and Face: Atraumatic with no masses, lesions, or scarring.      Salivary glands: No tenderness of the parotid glands or parotid masses.      No tenderness of the submandibular glands or submandibular masses.      Facial strength: Normal strength and symmetry, no synkinesis or  facial tic.      Eyes: Conjunctivas look non-hyperemic bilaterally     Ears: Bilaterally ear canals look normal. Tympanic membranes intact, no hyperemia, fluid or retraction.      Nose: Mucosa looks normal. No purulent discharge. Septum deviated to right markedly.     Oral Cavity/Mouth: Lips and tongue look normal.      Throat: No postnasal discharge. No tonsil hypertrophy. No hyperemia.     Neck: Symmetrical, trachea midline.      Pulmonary: Normal respiratory effort.      Lymphatic: No palpable pathologic lymph nodes at neck.      Neurological/Psychiatric: Orientation to person, place, and time: Normal.   Mood and affect: Normal.      Extremities: No clubbing.     LEFT NEEDLE MYRINGOTOMY and RIGHT INTRATYMPANIC STEROID INJECTION  Operative microscope was brought to patient's left ear. Topical phenol was applied posteroinferiorly, then myringotomy was done using 21 g needle and ~suction cleaning was done. Antibiotic drops were applied.    Operative microscope was brought to patient's right ear. Topical phenol was applied posterosuperiorly, then myringotomy was done using 25 g needle and ~0.3 ml 10mg/ml dexamethasone was injected intratympanically. Antibiotic drops were applied.    Procedure was concluded.      Patient Discussion/Summary     11.27.2024: He has decreased hearing like his ears are plugged for more than 2 weeks. He has tried oral generic augmentin and prednisone but could not find relief. Left side is worse.    On examination, TMs look intact .Possible effusion at left.  Tympanogram shows type B at left. Today he had left needle myringotomy and right IT steroid injection for right ETD.    Plan:  1- follow up in 2 weeks with hearing test  _________________________________________________________________    05.17.2023: He comes for postop follow up. Doing good. Feels 100% better. On examination, there was minor crusting over right anterior part of nasal septum.     Recommendation:  1- continue nasal  rinse and ointment  2- follow up in 2 months   ____________________________________________________________________________________________      Mr. Espinosa is a 73 yo M. He cannot breath well from his nose for years. It is worse at his right side.   He has tried nasal steroid spray for months, could nor find relief.     On examination, nasal septum deviated to right, right nasal passage looks tight. Dryness crusting in left nasal cavity (+)     Dx:  1- Deviated nasal septum     Plan:  1- CT sinus for surgical planning  2- septoplasty, turbinoplasty

## 2024-12-09 ENCOUNTER — APPOINTMENT (OUTPATIENT)
Dept: PRIMARY CARE | Facility: CLINIC | Age: 74
End: 2024-12-09
Payer: MEDICARE

## 2024-12-11 ENCOUNTER — APPOINTMENT (OUTPATIENT)
Dept: OTOLARYNGOLOGY | Facility: CLINIC | Age: 74
End: 2024-12-11
Payer: MEDICARE

## 2024-12-11 DIAGNOSIS — Z86.69 HISTORY OF EAR DISORDER: Primary | ICD-10-CM

## 2024-12-11 PROCEDURE — 1157F ADVNC CARE PLAN IN RCRD: CPT | Performed by: OTOLARYNGOLOGY

## 2024-12-11 PROCEDURE — 99212 OFFICE O/P EST SF 10 MIN: CPT | Performed by: OTOLARYNGOLOGY

## 2024-12-11 NOTE — PROGRESS NOTES
Chief Complaint   follow up ears     History of Present Illness    12.11.2024: He comes for follow-up.  No ear problems.  On examination, there is a pinpoint opening at the right tympanic membrane posterosuperiorly from previous myringotomy/injection.  The myringotomy at left tympanic membrane seems to have closed.    Plan  1-follow-up with hearing test  _________________________________________________________________    11.27.2024: He has decreased hearing like his ears are plugged for more than 2 weeks. He has tried oral generic augmentin and prednisone, but could not find relief. Left side is worse.    On examination, TMs look intact .Possible effusion at left.  Tympanogram shows type B at left. Today he had left needle myringotomy and right IT steroid injection for right ETD.    Plan:  1- follow up in 2 weeks with hearing test  _________________________________________________________________    05.17.2023: He comes for postop follow up. Doing good. Feels 100% better. On examination, there was minor crusting over right anterior part of nasal septum.     Recommendation:  1- continue nasal rinse and ointment  2- follow up in 2 months     ____________________________________________________________________________________________     Mr. Espinosa is a 73 yo M. He cannot breath well from his nose for years. It is worse at his right side.   He has tried nasal steroid spray for months, could nor find relief.     On examination, nasal septum deviated to right, right nasal passage looks tight. Dryness crusting in left nasal cavity (+)     Dx:  1- Deviated nasal septum     Plan:  1- CT sinus for surgical planning  2- septoplasty, turbinoplasty      Review of Systems  Please see scanned review of systems document in the chart.  ENMT: rhinorrhea,~sinus pressure,~nasal blockage/obstruction~and~snoring.   Respiratory: shortness of breath.   Neurological: restless legs syndrome.         Physical Exam  General appearance:  Healthy-appearing, well-nourished, well groomed, in no acute distress.      Head and Face: Atraumatic with no masses, lesions, or scarring.      Salivary glands: No tenderness of the parotid glands or parotid masses.      No tenderness of the submandibular glands or submandibular masses.      Facial strength: Normal strength and symmetry, no synkinesis or facial tic.      Eyes: Conjunctivas look non-hyperemic bilaterally     Ears: Bilaterally ear canals look normal. Tympanic membranes intact, no hyperemia, fluid or retraction.      Nose: Mucosa looks normal. No purulent discharge. Septum deviated to right markedly.     Oral Cavity/Mouth: Lips and tongue look normal.      Throat: No postnasal discharge. No tonsil hypertrophy. No hyperemia.     Neck: Symmetrical, trachea midline.      Pulmonary: Normal respiratory effort.      Lymphatic: No palpable pathologic lymph nodes at neck.      Neurological/Psychiatric: Orientation to person, place, and time: Normal.   Mood and affect: Normal.      Extremities: No clubbing.     LEFT NEEDLE MYRINGOTOMY and RIGHT INTRATYMPANIC STEROID INJECTION  Operative microscope was brought to patient's left ear. Topical phenol was applied posteroinferiorly, then myringotomy was done using 21 g needle and ~suction cleaning was done. Antibiotic drops were applied.    Operative microscope was brought to patient's right ear. Topical phenol was applied posterosuperiorly, then myringotomy was done using 25 g needle and ~0.3 ml 10mg/ml dexamethasone was injected intratympanically. Antibiotic drops were applied.    Procedure was concluded.      Patient Discussion/Summary     12.11.2024: He comes for follow-up.  No ear problems.  On examination, there is a pinpoint opening at the right tympanic membrane posterosuperiorly from previous myringotomy/injection.  The myringotomy at left tympanic membrane seems to have closed.    Plan  1-follow-up with hearing  test  _________________________________________________________________    11.27.2024: He has decreased hearing like his ears are plugged for more than 2 weeks. He has tried oral generic augmentin and prednisone but could not find relief. Left side is worse.    On examination, TMs look intact .Possible effusion at left.  Tympanogram shows type B at left. Today he had left needle myringotomy and right IT steroid injection for right ETD.    Plan:  1- follow up in 2 weeks with hearing test  _________________________________________________________________    05.17.2023: He comes for postop follow up. Doing good. Feels 100% better. On examination, there was minor crusting over right anterior part of nasal septum.     Recommendation:  1- continue nasal rinse and ointment  2- follow up in 2 months   ____________________________________________________________________________________________      Mr. Espinosa is a 71 yo M. He cannot breath well from his nose for years. It is worse at his right side.   He has tried nasal steroid spray for months, could nor find relief.     On examination, nasal septum deviated to right, right nasal passage looks tight. Dryness crusting in left nasal cavity (+)     Dx:  1- Deviated nasal septum     Plan:  1- CT sinus for surgical planning  2- septoplasty, turbinoplasty

## 2024-12-27 ENCOUNTER — APPOINTMENT (OUTPATIENT)
Dept: PRIMARY CARE | Facility: CLINIC | Age: 74
End: 2024-12-27
Payer: MEDICARE

## 2025-01-05 ENCOUNTER — OFFICE VISIT (OUTPATIENT)
Dept: URGENT CARE | Age: 75
End: 2025-01-05
Payer: MEDICARE

## 2025-01-05 VITALS
TEMPERATURE: 97 F | HEIGHT: 70 IN | BODY MASS INDEX: 32.93 KG/M2 | OXYGEN SATURATION: 97 % | WEIGHT: 230 LBS | DIASTOLIC BLOOD PRESSURE: 85 MMHG | HEART RATE: 65 BPM | SYSTOLIC BLOOD PRESSURE: 167 MMHG

## 2025-01-05 DIAGNOSIS — M54.12 CERVICAL RADICULOPATHY: Primary | ICD-10-CM

## 2025-01-05 DIAGNOSIS — M62.838 TRAPEZIUS MUSCLE SPASM: ICD-10-CM

## 2025-01-05 PROCEDURE — 1159F MED LIST DOCD IN RCRD: CPT | Performed by: PHYSICIAN ASSISTANT

## 2025-01-05 PROCEDURE — 1157F ADVNC CARE PLAN IN RCRD: CPT | Performed by: PHYSICIAN ASSISTANT

## 2025-01-05 PROCEDURE — 3079F DIAST BP 80-89 MM HG: CPT | Performed by: PHYSICIAN ASSISTANT

## 2025-01-05 PROCEDURE — 3008F BODY MASS INDEX DOCD: CPT | Performed by: PHYSICIAN ASSISTANT

## 2025-01-05 PROCEDURE — 3077F SYST BP >= 140 MM HG: CPT | Performed by: PHYSICIAN ASSISTANT

## 2025-01-05 PROCEDURE — 99203 OFFICE O/P NEW LOW 30 MIN: CPT | Performed by: PHYSICIAN ASSISTANT

## 2025-01-05 NOTE — PROGRESS NOTES
Subjective   Patient ID: Manfred Espinosa is a 74 y.o. male. They present today with a chief complaint of Other (Left arm has been causing pain from shoulder all the way down to wrist for 1 week/Tingling and numbness in fingers at times.  /Same thing years ago and he found out he had a herniated disk and they did surgery ).    History of Present Illness  Patient is a very pleasant 74-year-old white male, past medical history of cervical radiculopathy, presenting to the clinic for chief complaint of left shoulder pain patient is reporting approximately 1 week history of left shoulder pain that begins around his trapezius and does radiate down his left upper extremity.  He does endorse increased pain with abduction of the left upper extremity.  Denies any fall trauma or injury.  Denies any neck pain.  States did have similar symptoms in the remote past he was diagnosed with a slipped disc and did have surgery.  Denies any recent fall or trauma.  No numbness tingling or weakness.  No chest pain or shortness of breath.  No abdominal pain, nausea, vomiting.  States he has been using heat ice Tylenol and Salonpas at home with minimal relief.  Reported to clinic for further evaluation.            Past Medical History  Allergies as of 01/05/2025 - Reviewed 01/05/2025   Allergen Reaction Noted    Nitroglycerin Other 09/06/2023    Oxycodone-acetaminophen Hives and Rash 09/06/2023    Nitroglycerin Unknown 10/24/2023    Oxycodone-acetaminophen Hallucinations and Unknown 10/24/2023       (Not in a hospital admission)         Past Medical History:   Diagnosis Date    Autoimmune thyroiditis     Disease of thyroid gland     Mixed hyperlipidemia     Personal history of other endocrine, nutritional and metabolic disease     History of diabetes mellitus    Type 2 diabetes mellitus with hyperglycemia, without long-term current use of insulin     Vitamin B12 deficiency        Past Surgical History:   Procedure Laterality Date    MR HEAD  "ANGIO WO IV CONTRAST  08/17/2018    MR HEAD ANGIO WO IV CONTRAST [facility]    MR HEAD ANGIO WO IV CONTRAST  08/17/2018    MR HEAD ANGIO WO IV CONTRAST [facility]    NECK SURGERY Right     OTHER SURGICAL HISTORY  11/15/2021    Neck surgery    TOTAL KNEE ARTHROPLASTY Right 2017    TOTAL KNEE ARTHROPLASTY Left 12/18/2023        reports that he has quit smoking. His smoking use included cigarettes. He has a 60 pack-year smoking history. He has never used smokeless tobacco. He reports that he does not currently use alcohol. Drug use questions deferred to the physician.    Review of Systems  Review of Systems                               Objective    Vitals:    01/05/25 1537   BP: 167/85   Pulse: 65   Temp: 36.1 °C (97 °F)   TempSrc: Temporal   SpO2: 97%   Weight: 104 kg (230 lb)   Height: 1.778 m (5' 10\")     No LMP for male patient.    Physical Exam  General: Vitals Noted. No distress. Normocephalic.     HEENT: TMs normal, EOMI, normal conjunctiva, patent nares, Normal OP    Neck: Supple with no adenopathy.     Cardiac: Regular Rate and Rhythm. No murmur.     Pulmonary: Equal breath sounds bilaterally. No wheezes, rhonchi, or rales.    Abdomen: Soft, non-tender, with normal bowel sounds.     Musculoskeletal: There is tenderness to palpation along the left superior trapezius with palpable hypertonicity in the left medial scapular border.  He does have increased pain with active abduction of the left upper extremity.  Is neurovascular intact distally with cap if less than 2 seconds 2+ radial pulse and full  strength.  Also has full strength with shoulder shrug.  Otherwise moves all extremities, no effusion, no edema.     Skin: No obvious rashes.  Procedures    Point of Care Test & Imaging Results from this visit    No results found.    Diagnostic study results (if any) were reviewed by Oneal White PA-C.    Assessment/Plan   Allergies, medications, history, and pertinent labs/EKGs/Imaging " reviewed by Oneal White PA-C.     Medical Decision Making  Patient was seen by the clinic with complaint of left shoulder pain.  On exam patient is nontoxic very well-appearing respite comfortably no acute distress.  Vital signs are stable, afebrile.  Chest is clear, it is regular, belly soft and nontender peer evaluation of the left shoulder as above.  I feel there is a component of trapezius muscle strain with potentially cervical radiculopathy given the radiation of the pain down the extremity.  Given lack of fall trauma injury or cervical midline tenderness I do not feel necessity for imaging at this time.  Offered Toradol injection however refused.  Advised to report to the ED for advanced imaging if symptoms worsen and in the meantime advised ibuprofen at home along with ice and gentle stretching.  Advised very close follow-up with primary care physician.  Will be discharged home at this time.  Reviewed my impression, plan, strict return versus report to ED precautions with the patient.  He expresses understanding and agreement plan of care.  Patient does note that he would likely report to the ED for further imaging.    Orders and Diagnoses  There are no diagnoses linked to this encounter.      Medical Admin Record      Follow Up Instructions  No follow-ups on file.    Patient disposition: Home    Electronically signed by Oneal White PA-C  4:06 PM

## 2025-01-08 ENCOUNTER — APPOINTMENT (OUTPATIENT)
Dept: OTOLARYNGOLOGY | Facility: CLINIC | Age: 75
End: 2025-01-08
Payer: MEDICARE

## 2025-01-08 ENCOUNTER — APPOINTMENT (OUTPATIENT)
Dept: AUDIOLOGY | Facility: CLINIC | Age: 75
End: 2025-01-08
Payer: MEDICARE

## 2025-01-08 ENCOUNTER — LAB (OUTPATIENT)
Dept: LAB | Facility: LAB | Age: 75
End: 2025-01-08
Payer: MEDICARE

## 2025-01-08 DIAGNOSIS — E78.2 MIXED HYPERLIPIDEMIA: ICD-10-CM

## 2025-01-08 DIAGNOSIS — E11.65 TYPE 2 DIABETES MELLITUS WITH HYPERGLYCEMIA, WITHOUT LONG-TERM CURRENT USE OF INSULIN: ICD-10-CM

## 2025-01-08 DIAGNOSIS — H69.91 DISORDER OF RIGHT EUSTACHIAN TUBE: ICD-10-CM

## 2025-01-08 DIAGNOSIS — E53.8 DEFICIENCY OF OTHER SPECIFIED B GROUP VITAMINS: ICD-10-CM

## 2025-01-08 DIAGNOSIS — R09.81 NASAL CONGESTION: Primary | ICD-10-CM

## 2025-01-08 DIAGNOSIS — E11.65 TYPE 2 DIABETES MELLITUS WITH HYPERGLYCEMIA (MULTI): Primary | ICD-10-CM

## 2025-01-08 DIAGNOSIS — E06.3 AUTOIMMUNE THYROIDITIS: ICD-10-CM

## 2025-01-08 DIAGNOSIS — H90.A21 SENSORINEURAL HEARING LOSS (SNHL) OF RIGHT EAR WITH RESTRICTED HEARING OF LEFT EAR: ICD-10-CM

## 2025-01-08 DIAGNOSIS — H65.92 LEFT OTITIS MEDIA WITH EFFUSION: ICD-10-CM

## 2025-01-08 DIAGNOSIS — H90.A12 CONDUCTIVE HEARING LOSS OF LEFT EAR WITH RESTRICTED HEARING OF RIGHT EAR: Primary | ICD-10-CM

## 2025-01-08 LAB
CHOLEST SERPL-MCNC: 110 MG/DL (ref 0–199)
CHOLEST/HDLC SERPL: 2.1 {RATIO}
CREAT UR-MCNC: 106.7 MG/DL (ref 20–370)
HDLC SERPL-MCNC: 51.3 MG/DL
LDLC SERPL CALC-MCNC: 43 MG/DL
MICROALBUMIN UR-MCNC: <7 MG/L
MICROALBUMIN/CREAT UR: NORMAL MG/G{CREAT}
NON HDL CHOLESTEROL: 59 MG/DL (ref 0–149)
T4 FREE SERPL-MCNC: 0.49 NG/DL (ref 0.61–1.12)
TRIGL SERPL-MCNC: 80 MG/DL (ref 0–149)
TSH SERPL-ACNC: 27.61 MIU/L (ref 0.44–3.98)
VIT B12 SERPL-MCNC: 352 PG/ML (ref 211–911)
VLDL: 16 MG/DL (ref 0–40)

## 2025-01-08 PROCEDURE — 82607 VITAMIN B-12: CPT

## 2025-01-08 PROCEDURE — 82043 UR ALBUMIN QUANTITATIVE: CPT

## 2025-01-08 PROCEDURE — 92550 TYMPANOMETRY & REFLEX THRESH: CPT | Performed by: AUDIOLOGIST

## 2025-01-08 PROCEDURE — 1036F TOBACCO NON-USER: CPT | Performed by: OTOLARYNGOLOGY

## 2025-01-08 PROCEDURE — 80061 LIPID PANEL: CPT

## 2025-01-08 PROCEDURE — 84443 ASSAY THYROID STIM HORMONE: CPT

## 2025-01-08 PROCEDURE — 99213 OFFICE O/P EST LOW 20 MIN: CPT | Performed by: OTOLARYNGOLOGY

## 2025-01-08 PROCEDURE — 1159F MED LIST DOCD IN RCRD: CPT | Performed by: OTOLARYNGOLOGY

## 2025-01-08 PROCEDURE — 84439 ASSAY OF FREE THYROXINE: CPT

## 2025-01-08 PROCEDURE — 31231 NASAL ENDOSCOPY DX: CPT | Performed by: OTOLARYNGOLOGY

## 2025-01-08 PROCEDURE — 1157F ADVNC CARE PLAN IN RCRD: CPT | Performed by: OTOLARYNGOLOGY

## 2025-01-08 PROCEDURE — 92557 COMPREHENSIVE HEARING TEST: CPT | Performed by: AUDIOLOGIST

## 2025-01-08 PROCEDURE — 82570 ASSAY OF URINE CREATININE: CPT

## 2025-01-08 RX ORDER — OXYMETAZOLINE HYDROCHLORIDE 0.05 G/100ML
2 SPRAY, METERED NASAL EVERY 12 HOURS PRN
Qty: 30 ML | Refills: 0 | Status: SHIPPED | OUTPATIENT
Start: 2025-01-08 | End: 2025-01-13

## 2025-01-08 RX ORDER — FLUTICASONE PROPIONATE 50 MCG
2 SPRAY, SUSPENSION (ML) NASAL DAILY
Qty: 16 G | Refills: 0 | Status: SHIPPED | OUTPATIENT
Start: 2025-01-08 | End: 2026-01-08

## 2025-01-08 NOTE — PROGRESS NOTES
Chief Complaint   follow up ears     History of Present Illness    01.08.2024: His hearing is back to baseline. His nose gets clogged for the past 2 weeks.  On examination, left inferior nasal turbinate was congested and touching nasal septum.    Plan  1-fluticasone nasal spray  2-Afrin nasal spray for 5 days  3-follow-up in 2 weeks if the problem persists then possible radiofrequency turbinate reduction    _________________________________________________________________    12.11.2024: He comes for follow-up.  No ear problems.  On examination, there is a pinpoint opening at the right tympanic membrane posterosuperiorly from previous myringotomy/injection.  The myringotomy at left tympanic membrane seems to have closed.    Plan  1-follow-up with hearing test  _________________________________________________________________    11.27.2024: He has decreased hearing like his ears are plugged for more than 2 weeks. He has tried oral generic augmentin and prednisone, but could not find relief. Left side is worse.    On examination, TMs look intact .Possible effusion at left.  Tympanogram shows type B at left. Today he had left needle myringotomy and right IT steroid injection for right ETD.    Plan:  1- follow up in 2 weeks with hearing test  _________________________________________________________________    05.17.2023: He comes for postop follow up. Doing good. Feels 100% better. On examination, there was minor crusting over right anterior part of nasal septum.     Recommendation:  1- continue nasal rinse and ointment  2- follow up in 2 months     ____________________________________________________________________________________________     Mr. Espinosa is a 71 yo M. He cannot breath well from his nose for years. It is worse at his right side.   He has tried nasal steroid spray for months, could nor find relief.     On examination, nasal septum deviated to right, right nasal passage looks tight. Dryness crusting in  left nasal cavity (+)     Dx:  1- Deviated nasal septum     Plan:  1- CT sinus for surgical planning  2- septoplasty, turbinoplasty      Review of Systems  Please see scanned review of systems document in the chart.  ENMT: rhinorrhea,~sinus pressure,~nasal blockage/obstruction~and~snoring.   Respiratory: shortness of breath.   Neurological: restless legs syndrome.         Physical Exam  General appearance: Healthy-appearing, well-nourished, well groomed, in no acute distress.      Head and Face: Atraumatic with no masses, lesions, or scarring.      Salivary glands: No tenderness of the parotid glands or parotid masses.      No tenderness of the submandibular glands or submandibular masses.      Facial strength: Normal strength and symmetry, no synkinesis or facial tic.      Eyes: Conjunctivas look non-hyperemic bilaterally     Ears: Bilaterally ear canals look normal. Tympanic membranes intact, no hyperemia, fluid or retraction.      Nose: Mucosa looks normal. No purulent discharge. Septum deviated to right markedly.     Oral Cavity/Mouth: Lips and tongue look normal.      Throat: No postnasal discharge. No tonsil hypertrophy. No hyperemia.     Neck: Symmetrical, trachea midline.      Pulmonary: Normal respiratory effort.      Lymphatic: No palpable pathologic lymph nodes at neck.      Neurological/Psychiatric: Orientation to person, place, and time: Normal.   Mood and affect: Normal.      Extremities: No clubbing.     NASAL ENDOSCOPY 01.08.2024  Flexible endoscope was advanced through patient's nasal cavities. On examination patient's septum was mildly deviated to left.  Left inferior turbinate was congested.  Procedure was concluded.      Patient Discussion/Summary     01.08.2024: His hearing is back to baseline. His nose gets clogged for the past 2 weeks.  On examination, left inferior nasal turbinate was congested and touching nasal septum.    Plan  1-fluticasone nasal spray  2-Afrin nasal spray for 5  days  3-follow-up in 2 weeks if the problem persists then possible radiofrequency turbinate reduction    _________________________________________________________________    12.11.2024: He comes for follow-up.  No ear problems.  On examination, there is a pinpoint opening at the right tympanic membrane posterosuperiorly from previous myringotomy/injection.  The myringotomy at left tympanic membrane seems to have closed.    Plan  1-follow-up with hearing test  _________________________________________________________________    11.27.2024: He has decreased hearing like his ears are plugged for more than 2 weeks. He has tried oral generic augmentin and prednisone but could not find relief. Left side is worse.    On examination, TMs look intact .Possible effusion at left.  Tympanogram shows type B at left. Today he had left needle myringotomy and right IT steroid injection for right ETD.    Plan:  1- follow up in 2 weeks with hearing test  _________________________________________________________________    05.17.2023: He comes for postop follow up. Doing good. Feels 100% better. On examination, there was minor crusting over right anterior part of nasal septum.     Recommendation:  1- continue nasal rinse and ointment  2- follow up in 2 months   ____________________________________________________________________________________________      Mr. Espinosa is a 71 yo M. He cannot breath well from his nose for years. It is worse at his right side.   He has tried nasal steroid spray for months, could nor find relief.     On examination, nasal septum deviated to right, right nasal passage looks tight. Dryness crusting in left nasal cavity (+)     Dx:  1- Deviated nasal septum     Plan:  1- CT sinus for surgical planning  2- septoplasty, turbinoplasty

## 2025-01-08 NOTE — PROGRESS NOTES
AUDIOLOGY ADULT AUDIOMETRIC EVALUATION    Name:  Manfred Espinosa  :  1950  Age:  74 y.o.  Date of Evaluation:  2025    Reason for visit: Mr. Espinosa is seen in the clinic today at the request of otolaryngology for an audiologic evaluation.     HISTORY  Patient complains of left worse than right long term hearing loss and denies tinnitus, dizzy and fullness.    EVALUATION  See scanned audiogram: “Media” > “Audiology Report”.      RESULTS  Otoscopic Evaluation:  Right Ear: clear ear canal  Left Ear: clear ear canal    Immittance Measures:  Tympanometry:  Right Ear: Type As, reduced tympanic membrane mobility with normal middle ear pressure   Left Ear: Type As, reduced tympanic membrane mobility with normal middle ear pressure     Acoustic Reflexes:  Ipsilateral Right Ear:  100 100   Ipsilateral Left Ear:     100 100   Contralateral Right Ear: did not evaluate  Contralateral Left Ear: did not evaluate      Audiometry:  Test Technique and Reliability: BEHAVIORAL  Standard audiometry via supra-aural headphones. Reliability is good.    Pure tone air and bone conduction audiometry:  Right Ear:  WITHIN NORMAL LIMITS  TO 3 K HZ WITH A MILD MODERATE SNHL 4-8 K HZ.   Left Ear:  MILD CONDUCTIVE HEARING LOSS MOSTLY THROUGH 4 K HZ WITH A MODERATE TO SEVERE DROP 6-8 K HZ. ASYMMETRIC LONG TERM.     Speech Audiometry (Word Recognition Scores):   Right Ear:  88% GOOD  Left Ear:    92% GOOD    IMPRESSIONS  ASYMMETRIC CONDUCTIVE LEFT HEARING LOSS WITH MOSTLY NORMAL RIGHT HIGH FREQUENCY MODERATES EVERE AU.  The presence of acoustic reflexes within normal intensity limits is consistent with normal middle ear and brainstem function, and suggests that auditory sensitivity is not significantly impaired. An elevated or absent acoustic reflex threshold is consistent with a middle ear disorder, hearing loss in the stimulated ear, and/or interruption of neural innervation of the stapedius muscle. Present DPOAEs  suggest normal/near normal cochlear outer hair cell function and are consistent with no greater than a mild hearing loss at those frequencies. Absent DPOAEs are consistent with abnormal cochlear outer hair cell function and some degree of hearing loss at those frequencies.    RECOMMENDATIONS  - Follow up with otolaryngology today as scheduled.  - Audiologic evaluation as needed.  - Annual audiologic evaluation, sooner if an acute change is noted.  - Audiologic evaluation in conjunction with otologic care, if an acute change is noted, and/or annually.  - Consider hearing aids. DISCUSSED. Contact insurance to determine if there is an applicable benefit and where it can be used. Contact our office to schedule an appointment should he wish to proceed with hearing aids through our clinic.  - Follow-up with medical care team as planned.    PATIENT EDUCATION  Discussed results, impressions and recommendations with the patient. Questions were addressed and the patient was encouraged to contact our office should concerns arise.    Time for this encounter: 30 MINUTES     Mary Del Rosario  Licensed Audiologist

## 2025-01-09 DIAGNOSIS — E06.3 AUTOIMMUNE THYROIDITIS: ICD-10-CM

## 2025-01-09 DIAGNOSIS — E11.65 TYPE 2 DIABETES MELLITUS WITH HYPERGLYCEMIA, WITHOUT LONG-TERM CURRENT USE OF INSULIN: Primary | ICD-10-CM

## 2025-01-22 ENCOUNTER — APPOINTMENT (OUTPATIENT)
Dept: OTOLARYNGOLOGY | Facility: CLINIC | Age: 75
End: 2025-01-22
Payer: MEDICARE

## 2025-01-27 ENCOUNTER — OFFICE VISIT (OUTPATIENT)
Dept: PRIMARY CARE | Facility: CLINIC | Age: 75
End: 2025-01-27
Payer: MEDICARE

## 2025-01-27 ENCOUNTER — HOSPITAL ENCOUNTER (OUTPATIENT)
Dept: RADIOLOGY | Facility: HOSPITAL | Age: 75
Discharge: HOME | End: 2025-01-27
Payer: MEDICARE

## 2025-01-27 VITALS
HEART RATE: 72 BPM | BODY MASS INDEX: 34.49 KG/M2 | OXYGEN SATURATION: 98 % | DIASTOLIC BLOOD PRESSURE: 68 MMHG | SYSTOLIC BLOOD PRESSURE: 132 MMHG | WEIGHT: 240.4 LBS

## 2025-01-27 DIAGNOSIS — M54.12 CERVICAL RADICULOPATHY: Primary | ICD-10-CM

## 2025-01-27 DIAGNOSIS — M54.12 CERVICAL RADICULOPATHY: ICD-10-CM

## 2025-01-27 PROCEDURE — 99214 OFFICE O/P EST MOD 30 MIN: CPT | Performed by: INTERNAL MEDICINE

## 2025-01-27 PROCEDURE — 72050 X-RAY EXAM NECK SPINE 4/5VWS: CPT | Performed by: RADIOLOGY

## 2025-01-27 PROCEDURE — 3062F POS MACROALBUMINURIA REV: CPT | Performed by: INTERNAL MEDICINE

## 2025-01-27 PROCEDURE — 72050 X-RAY EXAM NECK SPINE 4/5VWS: CPT

## 2025-01-27 PROCEDURE — 3078F DIAST BP <80 MM HG: CPT | Performed by: INTERNAL MEDICINE

## 2025-01-27 PROCEDURE — 1125F AMNT PAIN NOTED PAIN PRSNT: CPT | Performed by: INTERNAL MEDICINE

## 2025-01-27 PROCEDURE — 3048F LDL-C <100 MG/DL: CPT | Performed by: INTERNAL MEDICINE

## 2025-01-27 PROCEDURE — 1159F MED LIST DOCD IN RCRD: CPT | Performed by: INTERNAL MEDICINE

## 2025-01-27 PROCEDURE — 3075F SYST BP GE 130 - 139MM HG: CPT | Performed by: INTERNAL MEDICINE

## 2025-01-27 PROCEDURE — 1157F ADVNC CARE PLAN IN RCRD: CPT | Performed by: INTERNAL MEDICINE

## 2025-01-27 PROCEDURE — 1036F TOBACCO NON-USER: CPT | Performed by: INTERNAL MEDICINE

## 2025-01-27 RX ORDER — PREDNISONE 10 MG/1
TABLET ORAL
Qty: 15 TABLET | Refills: 0 | Status: SHIPPED | OUTPATIENT
Start: 2025-01-27 | End: 2025-02-06

## 2025-01-27 ASSESSMENT — ENCOUNTER SYMPTOMS
OCCASIONAL FEELINGS OF UNSTEADINESS: 0
ARTHRALGIAS: 1
WEAKNESS: 1
NUMBNESS: 0
DEPRESSION: 0
LOSS OF SENSATION IN FEET: 0

## 2025-01-27 ASSESSMENT — PAIN SCALES - GENERAL: PAINLEVEL_OUTOF10: 7

## 2025-01-27 NOTE — PROGRESS NOTES
Subjective   Patient ID: Manfred Espinosa is a 74 y.o. male who presents for left shoulder pain.    L shoulder pain for 3-4 weeks--the whole shoulder and going down arm. Having weakness in hand. Similar problem 20+ years ago and needed surg on neck. Ibuprofen helps a little. Left arm feels very heavy with any motion.        Review of Systems   Musculoskeletal:  Positive for arthralgias (left shoulder and thumb).   Neurological:  Positive for weakness. Negative for numbness.       Objective   /68 (BP Location: Left arm, Patient Position: Sitting)   Pulse 72   Wt 109 kg (240 lb 6.4 oz)   SpO2 98%   BMI 34.49 kg/m²     Physical Exam  Musculoskeletal:      Comments: Very limited motion L shoulder and neck. Diminished strength L hand compared to R, diminished DTR L elbow   Skin:     Findings: No bruising or rash.       Assessment/Plan   Diagnoses and all orders for this visit:  Cervical radiculopathy  -     predniSONE (Deltasone) 10 mg tablet; Take 3 tablets (30 mg) by mouth once daily for 1 day, THEN 2 tablets (20 mg) once daily for 3 days, THEN 1 tablet (10 mg) once daily for 6 days.  -     XR cervical spine 2-3 views; Future  Will treat with prednisone and check cervical spine xray

## 2025-02-04 DIAGNOSIS — E78.2 MIXED HYPERLIPIDEMIA: ICD-10-CM

## 2025-02-04 RX ORDER — ROSUVASTATIN CALCIUM 10 MG/1
10 TABLET, COATED ORAL DAILY
Qty: 90 TABLET | Refills: 1 | Status: SHIPPED | OUTPATIENT
Start: 2025-02-04

## 2025-02-04 NOTE — PROGRESS NOTES
HPI   74 yo with hashimoto's disease, dm2 (dx 2021), dyslipidemia, gerd.  Last A1c-5.8%, today 5.8%.            Pt has glucose meter, testing <1x/day, does not recall recent glycemic values, no lows. Pt is doing better on carb control in the diet. Pt walks on a daily basis.          Taking metformin 500mg ER 1 pill twice per day (as much as he tolerates),  ran out trulicity 1.5mg approx. X3wks ago,  was getting via [employer].  -diomedes 15mg in the pas  -glimeperide 2mg in the past           Taking crestor 10mg for lipids and tolerating.          Taking Unithroid 200mcg (yisel) (pt has been out for several weeks, tsh-27 on 1/25 labs)  Pt takes thyroid medication am and PPI qhs. Pt denies obstructive goiter sx, currently feeling tired.         Current Outpatient Medications:     blood sugar diagnostic (ONETOUCH ULTRA BLUE TEST STRIP MISC), Instructions: use one strip daily and as needed if symptomatic; Dx E11.9, Disp: , Rfl:     fluticasone (Flonase) 50 mcg/actuation nasal spray, Administer 2 sprays into each nostril once daily. Shake gently. Before first use, prime pump. After use, clean tip and replace cap., Disp: 16 g, Rfl: 11    metFORMIN  mg 24 hr tablet, TAKE 1 TABLET (500mg) BY MOUTH TWICE DAILY, Disp: 180 tablet, Rfl: 1    Mounjaro 5 mg/0.5 mL pen injector, Inject 5 mg under the skin 1 (one) time per week., Disp: 6 mL, Rfl: 3    pantoprazole (ProtoNix) 40 mg EC tablet, Take 1 tablet (40 mg) by mouth once daily in the morning. Take before meals., Disp: 90 tablet, Rfl: 3    pioglitazone (Actos) 15 mg tablet, take 1 tablet by mouth once daily, Disp: 90 tablet, Rfl: 3    rosuvastatin (Crestor) 10 mg tablet, Take 1 tablet (10 mg) by mouth once daily. For 90 days, Disp: 90 tablet, Rfl: 1    sildenafil (Viagra) 100 mg tablet, Take 1 tablet (100 mg) by mouth if needed for erectile dysfunction., Disp: , Rfl:     tamsulosin (Flomax) 0.4 mg 24 hr capsule, Take 1 capsule (0.4 mg) by mouth once daily., Disp: , Rfl:      "Unithroid 200 mcg tablet, Take 1 tablet (200 mcg) by mouth once daily in the morning. Take before meals., Disp: 90 tablet, Rfl: 1    budesonide-formoteroL (Symbicort) 160-4.5 mcg/actuation inhaler, Inhale 2 puffs 2 times a day. Rinse mouth with water after use to reduce aftertaste and incidence of candidiasis. Do not swallow. (Patient not taking: Reported on 2/5/2025), Disp: 10.2 g, Rfl: 0    oxymetazoline (Afrin, oxymetazoline,) 0.05 % nasal spray, Administer 2 sprays into each nostril every 12 hours if needed for congestion for up to 5 days. Do not use for more than 3 days., Disp: 30 mL, Rfl: 0    predniSONE (Deltasone) 10 mg tablet, Take 3 tablets (30 mg) by mouth once daily for 1 day, THEN 2 tablets (20 mg) once daily for 3 days, THEN 1 tablet (10 mg) once daily for 6 days. (Patient not taking: Reported on 2/5/2025), Disp: 15 tablet, Rfl: 0      Allergies as of 02/05/2025 - Reviewed 02/05/2025   Allergen Reaction Noted    Nitroglycerin Other 09/06/2023    Oxycodone-acetaminophen Hives and Rash 09/06/2023    Nitroglycerin Unknown 10/24/2023    Oxycodone-acetaminophen Hallucinations and Unknown 10/24/2023         Review of Systems   Cardiology: Lightheadedness-denies.  Chest pain-denies.  Leg edema-denies.  Palpitations-denies.  Respiratory: Cough-denies. Shortness of breath-denies.  Wheezing-denies.  Gastroenterology: Constipation-denies.  Diarrhea-denies.  Heartburn-denies.  Endocrinology: Cold intolerance-denies.  Heat intolerance-denies.  Sweats-denies.  Neurology: Headache-denies.  Tremor-denies.  Neuropathy in extremities-denies.  Psychology: Low energy-denies.  Irritability-denies.  Sleep disturbances-denies.      /64   Pulse 68   Ht 1.778 m (5' 10\")   Wt 103 kg (228 lb)   BMI 32.71 kg/m²       Labs:  Lab Results   Component Value Date    WBC 6.3 09/05/2023    NRBC 0 09/05/2023    RBC 4.74 09/05/2023    HGB 14.1 09/05/2023    HCT 41.1 09/05/2023     09/05/2023     Lab Results   Component " Value Date    CALCIUM 9.1 09/05/2023    AST 29 08/29/2022    ALKPHOS 52 08/29/2022    BILITOT 1.1 08/29/2022    PROT 7.5 08/29/2022    ALBUMIN 4.5 08/29/2022    GLOB 3.0 08/29/2022    AGR 1.5 08/29/2022     09/05/2023    K 4.0 09/05/2023     09/05/2023    CO2 24 09/05/2023    ANIONGAP 12 09/05/2023    BUN 15 09/05/2023    CREATININE 0.9 09/05/2023    UREACREAUR 16.7 09/05/2023    GLUCOSE 102 (H) 09/05/2023    ALT 33 08/29/2022    EGFR 91 09/05/2023     Lab Results   Component Value Date    CHOL 110 01/08/2025    TRIG 80 01/08/2025    HDL 51.3 01/08/2025    LDLCALC 43 01/08/2025     Lab Results   Component Value Date    MICROALBCREA  01/08/2025      Comment:      One or more analytes used in this calculation is outside of the analytical measurement range. Calculation cannot be performed.     Lab Results   Component Value Date    TSH 27.61 (H) 01/08/2025     Lab Results   Component Value Date    SUMLTMMI53 352 01/08/2025     Lab Results   Component Value Date    HGBA1C 5.8 02/05/2025         Physical Exam   General Appearance: pleasant, cooperative, no acute distress  HEENT: no chemosis, no proptosis, no lid lag, no lid retraction  Neck: no lymphadenopathy, no thyromegaly, no dominant thyroid nodules  Heart: no murmurs, regular rate and rhythm, S1 and S2  Lungs: no wheezes, no rhonci, no rales  Extremities: no lower extremity swelling      Assessment/Plan   1. Type 2 diabetes mellitus with hyperglycemia, without long-term current use of insulin (Primary)  -A1c ordered and reviewed  -labs reviewed  -refills sent    -test at least weekly at different times of day  -increase mounjaro from 5 to 7.5mg (contacted  pap)    2. Mixed hyperlipidemia  -on statin and tolerating, labs reviewed, no change for now    3. Autoimmune thyroiditis  -for some reason pt out of unithroid 200mcg every day and is symptomatic  -refill and repeat tsh in the next 3 months         Follow Up:  Lino 6 months    -labs/tests/notes  reviewed  -reviewed and counseled patient on medication monitoring and side effects

## 2025-02-05 ENCOUNTER — APPOINTMENT (OUTPATIENT)
Dept: ENDOCRINOLOGY | Facility: CLINIC | Age: 75
End: 2025-02-05
Payer: MEDICARE

## 2025-02-05 VITALS
DIASTOLIC BLOOD PRESSURE: 64 MMHG | WEIGHT: 228 LBS | HEIGHT: 70 IN | SYSTOLIC BLOOD PRESSURE: 123 MMHG | HEART RATE: 68 BPM | BODY MASS INDEX: 32.64 KG/M2

## 2025-02-05 DIAGNOSIS — E11.65 TYPE 2 DIABETES MELLITUS WITH HYPERGLYCEMIA, WITHOUT LONG-TERM CURRENT USE OF INSULIN: Primary | ICD-10-CM

## 2025-02-05 DIAGNOSIS — E06.3 AUTOIMMUNE THYROIDITIS: ICD-10-CM

## 2025-02-05 DIAGNOSIS — E78.2 MIXED HYPERLIPIDEMIA: ICD-10-CM

## 2025-02-05 LAB — POC HEMOGLOBIN A1C: 5.8 % (ref 4.2–6.5)

## 2025-02-05 PROCEDURE — 83036 HEMOGLOBIN GLYCOSYLATED A1C: CPT | Performed by: INTERNAL MEDICINE

## 2025-02-05 PROCEDURE — 3008F BODY MASS INDEX DOCD: CPT | Performed by: INTERNAL MEDICINE

## 2025-02-05 PROCEDURE — 1159F MED LIST DOCD IN RCRD: CPT | Performed by: INTERNAL MEDICINE

## 2025-02-05 PROCEDURE — 1036F TOBACCO NON-USER: CPT | Performed by: INTERNAL MEDICINE

## 2025-02-05 PROCEDURE — 3078F DIAST BP <80 MM HG: CPT | Performed by: INTERNAL MEDICINE

## 2025-02-05 PROCEDURE — 3062F POS MACROALBUMINURIA REV: CPT | Performed by: INTERNAL MEDICINE

## 2025-02-05 PROCEDURE — 3074F SYST BP LT 130 MM HG: CPT | Performed by: INTERNAL MEDICINE

## 2025-02-05 PROCEDURE — 3048F LDL-C <100 MG/DL: CPT | Performed by: INTERNAL MEDICINE

## 2025-02-05 PROCEDURE — 1157F ADVNC CARE PLAN IN RCRD: CPT | Performed by: INTERNAL MEDICINE

## 2025-02-05 PROCEDURE — 1126F AMNT PAIN NOTED NONE PRSNT: CPT | Performed by: INTERNAL MEDICINE

## 2025-02-05 PROCEDURE — 99214 OFFICE O/P EST MOD 30 MIN: CPT | Performed by: INTERNAL MEDICINE

## 2025-02-05 RX ORDER — LEVOTHYROXINE SODIUM 200 UG/1
200 TABLET ORAL DAILY
Qty: 90 TABLET | Refills: 1 | Status: SHIPPED | OUTPATIENT
Start: 2025-02-05 | End: 2026-02-05

## 2025-02-05 ASSESSMENT — LIFESTYLE VARIABLES
SKIP TO QUESTIONS 9-10: 1
HOW OFTEN DO YOU HAVE SIX OR MORE DRINKS ON ONE OCCASION: NEVER
HOW OFTEN DO YOU HAVE A DRINK CONTAINING ALCOHOL: NEVER
AUDIT-C TOTAL SCORE: 0
HOW MANY STANDARD DRINKS CONTAINING ALCOHOL DO YOU HAVE ON A TYPICAL DAY: PATIENT DOES NOT DRINK

## 2025-02-05 ASSESSMENT — PATIENT HEALTH QUESTIONNAIRE - PHQ9
SUM OF ALL RESPONSES TO PHQ9 QUESTIONS 1 & 2: 0
1. LITTLE INTEREST OR PLEASURE IN DOING THINGS: NOT AT ALL
2. FEELING DOWN, DEPRESSED OR HOPELESS: NOT AT ALL

## 2025-02-05 ASSESSMENT — PAIN SCALES - GENERAL: PAINLEVEL_OUTOF10: 0-NO PAIN

## 2025-02-05 ASSESSMENT — ENCOUNTER SYMPTOMS
DEPRESSION: 0
OCCASIONAL FEELINGS OF UNSTEADINESS: 0
LOSS OF SENSATION IN FEET: 0

## 2025-02-06 ENCOUNTER — TELEPHONE (OUTPATIENT)
Dept: PHARMACY | Facility: HOSPITAL | Age: 75
End: 2025-02-06
Payer: MEDICARE

## 2025-02-06 NOTE — TELEPHONE ENCOUNTER
Population Health: Outreach by Ambulatory Pharmacy Team    Patient: Manfred Espinosa  Primary Care Provider (PCP): Philly Fischer MD  Payor: Dahiana PLATA  Reason: Adherence  Medication(s): rosuvastatin 10mg tablet  Outcome: No Answer/Invalid Number/Voicemail Box Full    Autumn Gruber [facility]

## 2025-02-06 NOTE — TELEPHONE ENCOUNTER
I reviewed the progress note and agree with the resident’s findings and plans as written. Case discussed with resident.    Danna Wetzel, OneydaD

## 2025-02-10 ENCOUNTER — OFFICE VISIT (OUTPATIENT)
Dept: PRIMARY CARE | Facility: CLINIC | Age: 75
End: 2025-02-10
Payer: MEDICARE

## 2025-02-10 VITALS
OXYGEN SATURATION: 98 % | WEIGHT: 231.5 LBS | TEMPERATURE: 98.3 F | HEART RATE: 70 BPM | BODY MASS INDEX: 33.22 KG/M2 | SYSTOLIC BLOOD PRESSURE: 132 MMHG | DIASTOLIC BLOOD PRESSURE: 78 MMHG

## 2025-02-10 DIAGNOSIS — J20.9 ACUTE BRONCHITIS, UNSPECIFIED ORGANISM: Primary | ICD-10-CM

## 2025-02-10 PROCEDURE — 1036F TOBACCO NON-USER: CPT | Performed by: INTERNAL MEDICINE

## 2025-02-10 PROCEDURE — 1126F AMNT PAIN NOTED NONE PRSNT: CPT | Performed by: INTERNAL MEDICINE

## 2025-02-10 PROCEDURE — 1159F MED LIST DOCD IN RCRD: CPT | Performed by: INTERNAL MEDICINE

## 2025-02-10 PROCEDURE — 99214 OFFICE O/P EST MOD 30 MIN: CPT | Performed by: INTERNAL MEDICINE

## 2025-02-10 PROCEDURE — 3048F LDL-C <100 MG/DL: CPT | Performed by: INTERNAL MEDICINE

## 2025-02-10 PROCEDURE — 1157F ADVNC CARE PLAN IN RCRD: CPT | Performed by: INTERNAL MEDICINE

## 2025-02-10 PROCEDURE — 3062F POS MACROALBUMINURIA REV: CPT | Performed by: INTERNAL MEDICINE

## 2025-02-10 PROCEDURE — 3078F DIAST BP <80 MM HG: CPT | Performed by: INTERNAL MEDICINE

## 2025-02-10 PROCEDURE — 3075F SYST BP GE 130 - 139MM HG: CPT | Performed by: INTERNAL MEDICINE

## 2025-02-10 RX ORDER — BENZONATATE 200 MG/1
200 CAPSULE ORAL 3 TIMES DAILY PRN
Qty: 42 CAPSULE | Refills: 0 | Status: SHIPPED | OUTPATIENT
Start: 2025-02-10 | End: 2025-03-12

## 2025-02-10 RX ORDER — AZITHROMYCIN 250 MG/1
TABLET, FILM COATED ORAL
Qty: 6 TABLET | Refills: 0 | Status: SHIPPED | OUTPATIENT
Start: 2025-02-10 | End: 2025-02-15

## 2025-02-10 ASSESSMENT — ENCOUNTER SYMPTOMS
SORE THROAT: 0
RHINORRHEA: 0
HEADACHES: 0
DEPRESSION: 0
COUGH: 1
WHEEZING: 1
LOSS OF SENSATION IN FEET: 0
OCCASIONAL FEELINGS OF UNSTEADINESS: 0

## 2025-02-10 ASSESSMENT — PAIN SCALES - GENERAL: PAINLEVEL_OUTOF10: 0-NO PAIN

## 2025-02-10 NOTE — PROGRESS NOTES
Subjective   Patient ID: Manfred Espinosa is a 74 y.o. male who presents for Nasal Congestion.    Tested negative for covid    URI   This is a new problem. Episode onset: 10+ days. The problem has been gradually improving. There has been no fever. Associated symptoms include congestion, coughing and wheezing. Pertinent negatives include no ear pain, headaches, rhinorrhea or sore throat. He has tried acetaminophen (otc cough med) for the symptoms. The treatment provided mild relief.        Review of Systems   HENT:  Positive for congestion. Negative for ear pain, rhinorrhea and sore throat.    Respiratory:  Positive for cough and wheezing.    Neurological:  Negative for headaches.       Objective   /78 (BP Location: Left arm, Patient Position: Sitting)   Pulse 70   Temp 36.8 °C (98.3 °F)   Wt 105 kg (231 lb 8 oz)   SpO2 98%   BMI 33.22 kg/m²     Physical Exam  Constitutional:       General: He is not in acute distress.     Appearance: He is not ill-appearing.   HENT:      Ears:      Comments: Fluid but not red behind TM     Nose: No congestion.      Mouth/Throat:      Pharynx: Oropharynx is clear.   Cardiovascular:      Rate and Rhythm: Normal rate and regular rhythm.   Pulmonary:      Breath sounds: No wheezing or rhonchi.      Comments: Coughs in spasms with wheeze and rhonchi during the spasms  Chest:      Chest wall: No tenderness.   Lymphadenopathy:      Cervical: No cervical adenopathy.         Assessment/Plan   Assessment & Plan  Acute bronchitis, unspecified organism    Orders:    azithromycin (Zithromax) 250 mg tablet; Take 2 tablets (500 mg) by mouth once daily for 1 day, THEN 1 tablet (250 mg) once daily for 4 days. Take 2 tabs (500 mg) by mouth today, than 1 daily for 4 days..    benzonatate (Tessalon) 200 mg capsule; Take 1 capsule (200 mg) by mouth 3 times a day as needed for cough. Do not crush or chew.    Advised to call if worsens

## 2025-02-27 PROCEDURE — RXMED WILLOW AMBULATORY MEDICATION CHARGE

## 2025-02-28 ENCOUNTER — OFFICE VISIT (OUTPATIENT)
Dept: PRIMARY CARE | Facility: CLINIC | Age: 75
End: 2025-02-28
Payer: MEDICARE

## 2025-02-28 VITALS
WEIGHT: 229.2 LBS | HEART RATE: 66 BPM | OXYGEN SATURATION: 98 % | DIASTOLIC BLOOD PRESSURE: 62 MMHG | BODY MASS INDEX: 32.89 KG/M2 | SYSTOLIC BLOOD PRESSURE: 118 MMHG

## 2025-02-28 DIAGNOSIS — F51.01 PRIMARY INSOMNIA: Primary | ICD-10-CM

## 2025-02-28 PROCEDURE — 3048F LDL-C <100 MG/DL: CPT | Performed by: INTERNAL MEDICINE

## 2025-02-28 PROCEDURE — 3078F DIAST BP <80 MM HG: CPT | Performed by: INTERNAL MEDICINE

## 2025-02-28 PROCEDURE — 1036F TOBACCO NON-USER: CPT | Performed by: INTERNAL MEDICINE

## 2025-02-28 PROCEDURE — 1159F MED LIST DOCD IN RCRD: CPT | Performed by: INTERNAL MEDICINE

## 2025-02-28 PROCEDURE — 3074F SYST BP LT 130 MM HG: CPT | Performed by: INTERNAL MEDICINE

## 2025-02-28 PROCEDURE — 99213 OFFICE O/P EST LOW 20 MIN: CPT | Performed by: INTERNAL MEDICINE

## 2025-02-28 PROCEDURE — 1126F AMNT PAIN NOTED NONE PRSNT: CPT | Performed by: INTERNAL MEDICINE

## 2025-02-28 PROCEDURE — 1157F ADVNC CARE PLAN IN RCRD: CPT | Performed by: INTERNAL MEDICINE

## 2025-02-28 PROCEDURE — 3062F POS MACROALBUMINURIA REV: CPT | Performed by: INTERNAL MEDICINE

## 2025-02-28 RX ORDER — CHOLECALCIFEROL (VITAMIN D3) 25 MCG
3 TABLET ORAL NIGHTLY
COMMUNITY
Start: 2025-02-28

## 2025-02-28 ASSESSMENT — ENCOUNTER SYMPTOMS
OCCASIONAL FEELINGS OF UNSTEADINESS: 0
DEPRESSION: 0
LOSS OF SENSATION IN FEET: 0

## 2025-02-28 ASSESSMENT — PAIN SCALES - GENERAL: PAINLEVEL_OUTOF10: 0-NO PAIN

## 2025-02-28 NOTE — PROGRESS NOTES
Subjective   Patient ID: Manfred Espinosa is a 74 y.o. male who presents for 1 month follow up.    Neck resolved    URI much better-only sl cough left    Able to fall asleep but can't stay asleep. Works nights 4 days/week and ok then it's days he has off has trouble.              Objective   /62 (BP Location: Left arm, Patient Position: Sitting)   Pulse 66   Wt 104 kg (229 lb 3.2 oz)   SpO2 98%   BMI 32.89 kg/m²         Assessment/Plan   Assessment & Plan  Primary insomnia    Orders:    melatonin 3 mg tablet extended release; Take 1 tablet (3 mg) by mouth once daily at bedtime. Take about 1 hour before bed    Will try melatonin for sleep

## 2025-03-01 NOTE — ASSESSMENT & PLAN NOTE
Orders:    melatonin 3 mg tablet extended release; Take 1 tablet (3 mg) by mouth once daily at bedtime. Take about 1 hour before bed

## 2025-03-03 ENCOUNTER — PHARMACY VISIT (OUTPATIENT)
Dept: PHARMACY | Facility: CLINIC | Age: 75
End: 2025-03-03
Payer: MEDICARE

## 2025-03-12 DIAGNOSIS — K21.9 CHRONIC GERD: ICD-10-CM

## 2025-03-12 RX ORDER — PANTOPRAZOLE SODIUM 40 MG/1
40 TABLET, DELAYED RELEASE ORAL
Qty: 90 TABLET | Refills: 3 | Status: SHIPPED | OUTPATIENT
Start: 2025-03-12

## 2025-04-08 DIAGNOSIS — E11.65 TYPE 2 DIABETES MELLITUS WITH HYPERGLYCEMIA, WITHOUT LONG-TERM CURRENT USE OF INSULIN: ICD-10-CM

## 2025-04-08 RX ORDER — METFORMIN HYDROCHLORIDE 500 MG/1
500 TABLET, EXTENDED RELEASE ORAL 2 TIMES DAILY
Qty: 180 TABLET | Refills: 1 | Status: SHIPPED | OUTPATIENT
Start: 2025-04-08

## 2025-04-23 ENCOUNTER — APPOINTMENT (OUTPATIENT)
Dept: RADIOLOGY | Facility: HOSPITAL | Age: 75
End: 2025-04-23
Payer: MEDICARE

## 2025-05-23 ENCOUNTER — PHARMACY VISIT (OUTPATIENT)
Dept: PHARMACY | Facility: CLINIC | Age: 75
End: 2025-05-23
Payer: MEDICARE

## 2025-05-23 PROCEDURE — RXMED WILLOW AMBULATORY MEDICATION CHARGE

## 2025-06-10 ENCOUNTER — TELEPHONE (OUTPATIENT)
Facility: CLINIC | Age: 75
End: 2025-06-10
Payer: MEDICARE

## 2025-06-10 NOTE — TELEPHONE ENCOUNTER
Attempted to talk to Francisca regarding her 08/05/2025 appt with Laura needing to be rescheduled due to provider going on vacation. However, her phone was busy.

## 2025-06-10 NOTE — TELEPHONE ENCOUNTER
Called and spoke to Francisca  Manfred letting him know that I need to reschedule his 08/05/2025 appt with Laura because she is going on vacation. Rescheduled to 09/11/2025 .

## 2025-06-23 ENCOUNTER — OFFICE VISIT (OUTPATIENT)
Dept: PRIMARY CARE | Facility: CLINIC | Age: 75
End: 2025-06-23
Payer: MEDICARE

## 2025-06-23 VITALS
SYSTOLIC BLOOD PRESSURE: 128 MMHG | DIASTOLIC BLOOD PRESSURE: 72 MMHG | BODY MASS INDEX: 29.78 KG/M2 | HEART RATE: 79 BPM | WEIGHT: 208 LBS | HEIGHT: 70 IN | OXYGEN SATURATION: 95 %

## 2025-06-23 DIAGNOSIS — E11.40 TYPE 2 DIABETES MELLITUS WITH DIABETIC NEUROPATHY, WITHOUT LONG-TERM CURRENT USE OF INSULIN: ICD-10-CM

## 2025-06-23 DIAGNOSIS — S46.012A STRAIN OF ROTATOR CUFF OF BOTH SHOULDERS: Primary | ICD-10-CM

## 2025-06-23 DIAGNOSIS — S46.011A STRAIN OF ROTATOR CUFF OF BOTH SHOULDERS: Primary | ICD-10-CM

## 2025-06-23 PROBLEM — E66.01 MORBID OBESITY (MULTI): Status: RESOLVED | Noted: 2023-09-06 | Resolved: 2025-06-23

## 2025-06-23 PROCEDURE — 3044F HG A1C LEVEL LT 7.0%: CPT | Performed by: INTERNAL MEDICINE

## 2025-06-23 PROCEDURE — 3008F BODY MASS INDEX DOCD: CPT | Performed by: INTERNAL MEDICINE

## 2025-06-23 PROCEDURE — 1159F MED LIST DOCD IN RCRD: CPT | Performed by: INTERNAL MEDICINE

## 2025-06-23 PROCEDURE — 1036F TOBACCO NON-USER: CPT | Performed by: INTERNAL MEDICINE

## 2025-06-23 PROCEDURE — 3062F POS MACROALBUMINURIA REV: CPT | Performed by: INTERNAL MEDICINE

## 2025-06-23 PROCEDURE — 99213 OFFICE O/P EST LOW 20 MIN: CPT | Performed by: INTERNAL MEDICINE

## 2025-06-23 PROCEDURE — 3048F LDL-C <100 MG/DL: CPT | Performed by: INTERNAL MEDICINE

## 2025-06-23 PROCEDURE — 1125F AMNT PAIN NOTED PAIN PRSNT: CPT | Performed by: INTERNAL MEDICINE

## 2025-06-23 PROCEDURE — 3074F SYST BP LT 130 MM HG: CPT | Performed by: INTERNAL MEDICINE

## 2025-06-23 PROCEDURE — 3078F DIAST BP <80 MM HG: CPT | Performed by: INTERNAL MEDICINE

## 2025-06-23 RX ORDER — MELOXICAM 15 MG/1
15 TABLET ORAL DAILY
Qty: 30 TABLET | Refills: 11 | Status: SHIPPED | OUTPATIENT
Start: 2025-06-23 | End: 2026-06-23

## 2025-06-23 ASSESSMENT — PAIN SCALES - GENERAL: PAINLEVEL_OUTOF10: 8

## 2025-06-23 NOTE — PROGRESS NOTES
"Subjective   Patient ID: Manfred Espinosa is a 74 y.o. male who presents for Shoulder Pain (Bilateral /).    R>>L shoulder pain for weeks. Does a lot of lifting at work-including above his head--ice packs helps a little. Both top and lateral upper edge.  No swelling or bruising. No fall or injury. Doesn't feel arms are week. Lifting anything hurts.               Objective   /72   Pulse 79   Ht 1.778 m (5' 10\")   Wt 94.3 kg (208 lb)   SpO2 95%   BMI 29.84 kg/m²     Physical Exam  Musculoskeletal:      Comments: R>>L shoulder tender along lat insertion and sl down muscle. Pain with active and passive motion shoulder         Assessment/Plan   Assessment & Plan  Strain of rotator cuff of both shoulders    Orders:    meloxicam (Mobic) 15 mg tablet; Take 1 tablet (15 mg) by mouth once daily.  will take few days off of work--if doesn't improve with rest and NSAID will refer to ortho  Type 2 diabetes mellitus with diabetic neuropathy, without long-term current use of insulin                "

## 2025-07-21 ENCOUNTER — HOSPITAL ENCOUNTER (OUTPATIENT)
Dept: RADIOLOGY | Facility: HOSPITAL | Age: 75
Discharge: HOME | End: 2025-07-21
Payer: MEDICARE

## 2025-07-21 DIAGNOSIS — M75.121 COMPLETE ROTATOR CUFF TEAR OR RUPTURE OF RIGHT SHOULDER, NOT SPECIFIED AS TRAUMATIC: ICD-10-CM

## 2025-07-21 PROCEDURE — 73221 MRI JOINT UPR EXTREM W/O DYE: CPT | Mod: RIGHT SIDE | Performed by: RADIOLOGY

## 2025-07-21 PROCEDURE — 73221 MRI JOINT UPR EXTREM W/O DYE: CPT | Mod: RT

## 2025-07-24 DIAGNOSIS — E11.65 TYPE 2 DIABETES MELLITUS WITH HYPERGLYCEMIA, WITHOUT LONG-TERM CURRENT USE OF INSULIN: Primary | ICD-10-CM

## 2025-07-30 LAB
ALBUMIN SERPL-MCNC: 4.5 G/DL (ref 3.6–5.1)
ALBUMIN/CREAT UR: 6 MG/G CREAT
ALP SERPL-CCNC: 46 U/L (ref 35–144)
ALT SERPL-CCNC: 18 U/L (ref 9–46)
ANION GAP SERPL CALCULATED.4IONS-SCNC: 10 MMOL/L (CALC) (ref 7–17)
AST SERPL-CCNC: 18 U/L (ref 10–35)
BASOPHILS # BLD AUTO: 11 CELLS/UL (ref 0–200)
BASOPHILS NFR BLD AUTO: 0.2 %
BILIRUB SERPL-MCNC: 1.3 MG/DL (ref 0.2–1.2)
BUN SERPL-MCNC: 17 MG/DL (ref 7–25)
CALCIUM SERPL-MCNC: 9.1 MG/DL (ref 8.6–10.3)
CHLORIDE SERPL-SCNC: 104 MMOL/L (ref 98–110)
CO2 SERPL-SCNC: 25 MMOL/L (ref 20–32)
CREAT SERPL-MCNC: 1.04 MG/DL (ref 0.7–1.28)
CREAT UR-MCNC: 346 MG/DL (ref 20–320)
EGFRCR SERPLBLD CKD-EPI 2021: 76 ML/MIN/1.73M2
EOSINOPHIL # BLD AUTO: 11 CELLS/UL (ref 15–500)
EOSINOPHIL NFR BLD AUTO: 0.2 %
ERYTHROCYTE [DISTWIDTH] IN BLOOD BY AUTOMATED COUNT: 13.4 % (ref 11–15)
GLUCOSE SERPL-MCNC: 116 MG/DL (ref 65–139)
HCT VFR BLD AUTO: 42.1 % (ref 38.5–50)
HGB BLD-MCNC: 14 G/DL (ref 13.2–17.1)
LYMPHOCYTES # BLD AUTO: 981 CELLS/UL (ref 850–3900)
LYMPHOCYTES NFR BLD AUTO: 18.5 %
MCH RBC QN AUTO: 30.5 PG (ref 27–33)
MCHC RBC AUTO-ENTMCNC: 33.3 G/DL (ref 32–36)
MCV RBC AUTO: 91.7 FL (ref 80–100)
MICROALBUMIN UR-MCNC: 2 MG/DL
MONOCYTES # BLD AUTO: 488 CELLS/UL (ref 200–950)
MONOCYTES NFR BLD AUTO: 9.2 %
NEUTROPHILS # BLD AUTO: 3811 CELLS/UL (ref 1500–7800)
NEUTROPHILS NFR BLD AUTO: 71.9 %
PLATELET # BLD AUTO: 217 THOUSAND/UL (ref 140–400)
PMV BLD REES-ECKER: 9.4 FL (ref 7.5–12.5)
POTASSIUM SERPL-SCNC: 4.3 MMOL/L (ref 3.5–5.3)
PROT SERPL-MCNC: 6.9 G/DL (ref 6.1–8.1)
RBC # BLD AUTO: 4.59 MILLION/UL (ref 4.2–5.8)
SODIUM SERPL-SCNC: 139 MMOL/L (ref 135–146)
TSH SERPL-ACNC: 1.7 MIU/L (ref 0.4–4.5)
WBC # BLD AUTO: 5.3 THOUSAND/UL (ref 3.8–10.8)

## 2025-07-30 NOTE — PROGRESS NOTES
HPI   73 yo with hashimoto's disease, dm2 (dx 2021), dyslipidemia, gerd.  Last A1c-5.8%, today 5.3%.            Pt has glucose meter, testing <1x/day, does not recall recent glycemic values, no lows. Pt is doing better on carb control in the diet. Pt walks on a daily basis.          Taking metformin 500mg ER 1 pill twice per day (as much as he tolerates),  mounjaro 7.5 (uhpap), had taken trulicity in the past  -diomedes 15mg in the past  -glimeperide 2mg in the past           Taking crestor 10mg for lipids and tolerating.          Taking Unithroid 200mcg (yisel)-pt back on and taking, see labs below.  Pt takes thyroid medication am and PPI qhs. Pt denies obstructive goiter sx, currently feeling tired.         Current Outpatient Medications:     blood sugar diagnostic (ONETOUCH ULTRA BLUE TEST STRIP MISC), Instructions: use one strip daily and as needed if symptomatic; Dx E11.9, Disp: , Rfl:     fluticasone (Flonase) 50 mcg/actuation nasal spray, Administer 2 sprays into each nostril once daily. Shake gently. Before first use, prime pump. After use, clean tip and replace cap., Disp: 16 g, Rfl: 11    levothyroxine (Synthroid, Levoxyl) 200 mcg tablet, Take 1 tablet (200 mcg) by mouth early in the morning.. Take on an empty stomach at the same time each day, either 30 to 60 minutes prior to breakfast, Disp: 90 tablet, Rfl: 1    meloxicam (Mobic) 15 mg tablet, Take 1 tablet (15 mg) by mouth once daily., Disp: 30 tablet, Rfl: 11    metFORMIN  mg 24 hr tablet, TAKE 1 TABLET BY MOUTH TWICE DAILY, Disp: 180 tablet, Rfl: 1    pantoprazole (ProtoNix) 40 mg EC tablet, Take 1 tablet (40 mg) by mouth once daily in the morning. Take before meals., Disp: 90 tablet, Rfl: 3    pioglitazone (Actos) 15 mg tablet, take 1 tablet by mouth once daily, Disp: 90 tablet, Rfl: 3    rosuvastatin (Crestor) 10 mg tablet, Take 1 tablet (10 mg) by mouth once daily. For 90 days, Disp: 90 tablet, Rfl: 1    sildenafil (Viagra) 100 mg tablet, Take 1  tablet (100 mg) by mouth if needed for erectile dysfunction., Disp: , Rfl:     tamsulosin (Flomax) 0.4 mg 24 hr capsule, Take 1 capsule (0.4 mg) by mouth once daily., Disp: , Rfl:     tirzepatide (Mounjaro) 7.5 mg/0.5 mL pen injector, Inject 7.5 mg under the skin 1 (one) time per week., Disp: 6 mL, Rfl: 3    Unithroid 200 mcg tablet, Take 1 tablet (200 mcg) by mouth once daily in the morning. Take before meals., Disp: 90 tablet, Rfl: 1      Allergies as of 07/31/2025 - Reviewed 07/31/2025   Allergen Reaction Noted    Nitroglycerin Other 09/06/2023    Oxycodone-acetaminophen Hives and Rash 09/06/2023    Nitroglycerin Unknown 10/24/2023    Oxycodone-acetaminophen Hallucinations and Unknown 10/24/2023         Review of Systems   Cardiology: Lightheadedness-denies.  Chest pain-denies.  Leg edema-denies.  Palpitations-denies.  Respiratory: Cough-denies. Shortness of breath-denies.  Wheezing-denies.  Gastroenterology: Constipation-denies.  Diarrhea-denies.  Heartburn-denies.  Endocrinology: Cold intolerance-denies.  Heat intolerance-denies.  Sweats-denies.  Neurology: Headache-denies.  Tremor-denies.  Neuropathy in extremities + in feet  Psychology: Low energy-denies.  Irritability-denies.  Sleep disturbances + chronic      BP 95/60   Pulse 75   Wt 102 kg (224 lb)   BMI 32.14 kg/m²       Labs:  Lab Results   Component Value Date    WBC 5.3 07/29/2025    NRBC 0 09/05/2023    RBC 4.59 07/29/2025    HGB 14.0 07/29/2025    HCT 42.1 07/29/2025     07/29/2025     Lab Results   Component Value Date    CALCIUM 9.1 07/29/2025    AST 18 07/29/2025    ALKPHOS 46 07/29/2025    BILITOT 1.3 (H) 07/29/2025    PROT 6.9 07/29/2025    ALBUMIN 4.5 07/29/2025    GLOB 3.0 08/29/2022    AGR 1.5 08/29/2022     07/29/2025    K 4.3 07/29/2025     07/29/2025    CO2 25 07/29/2025    ANIONGAP 10 07/29/2025    BUN 17 07/29/2025    CREATININE 1.04 07/29/2025    UREACREAUR 16.7 09/05/2023    GLUCOSE 116 07/29/2025    ALT 18  07/29/2025    EGFR 76 07/29/2025     Lab Results   Component Value Date    CHOL 110 01/08/2025    TRIG 80 01/08/2025    HDL 51.3 01/08/2025    LDLCALC 43 01/08/2025     Lab Results   Component Value Date    MICROALBCREA 6 07/29/2025     Lab Results   Component Value Date    TSH 1.70 07/29/2025     Lab Results   Component Value Date    OTUZETKO07 352 01/08/2025     Lab Results   Component Value Date    HGBA1C 5.3 07/31/2025         Physical Exam   General Appearance: pleasant, cooperative, no acute distress  HEENT: no chemosis, no proptosis, no lid lag, no lid retraction  Neck: no lymphadenopathy, no thyromegaly, no dominant thyroid nodules  Heart: no murmurs, regular rate and rhythm, S1 and S2  Lungs: no wheezes, no rhonci, no rales  Extremities: no lower extremity swelling      Assessment/Plan   1. Type 2 diabetes mellitus with hyperglycemia, without long-term current use of insulin (Primary)  -A1c ordered and reviewed  -labs reviewed  -refills sent    -can try lowering metformin to 1 pill daily until next visit, at next visit likely come off  -offered to increase mounjaro, pt would prefer to stay on 7.5mg dose    -some mild neuropathic foot symptoms, can monitor at this level    2. Mixed hyperlipidemia  -on statin, labs reviewed, will follow    3. Autoimmune thyroiditis  -euthyroid, back on medication, labs reviewed, no change      Follow Up:  PharmD 6 months    -labs/tests/notes reviewed  -reviewed and counseled patient on medication monitoring and side effects

## 2025-07-31 ENCOUNTER — OFFICE VISIT (OUTPATIENT)
Facility: CLINIC | Age: 75
End: 2025-07-31
Payer: MEDICARE

## 2025-07-31 VITALS
BODY MASS INDEX: 32.14 KG/M2 | SYSTOLIC BLOOD PRESSURE: 95 MMHG | DIASTOLIC BLOOD PRESSURE: 60 MMHG | WEIGHT: 224 LBS | HEART RATE: 75 BPM

## 2025-07-31 DIAGNOSIS — E11.65 TYPE 2 DIABETES MELLITUS WITH HYPERGLYCEMIA, WITHOUT LONG-TERM CURRENT USE OF INSULIN: Primary | ICD-10-CM

## 2025-07-31 DIAGNOSIS — E78.2 MIXED HYPERLIPIDEMIA: ICD-10-CM

## 2025-07-31 DIAGNOSIS — E06.3 AUTOIMMUNE THYROIDITIS: ICD-10-CM

## 2025-07-31 LAB — POC HEMOGLOBIN A1C: 5.3 % (ref 4.2–6.5)

## 2025-07-31 PROCEDURE — 99214 OFFICE O/P EST MOD 30 MIN: CPT | Performed by: INTERNAL MEDICINE

## 2025-07-31 PROCEDURE — 3074F SYST BP LT 130 MM HG: CPT | Performed by: INTERNAL MEDICINE

## 2025-07-31 PROCEDURE — 83036 HEMOGLOBIN GLYCOSYLATED A1C: CPT | Performed by: INTERNAL MEDICINE

## 2025-07-31 PROCEDURE — 1125F AMNT PAIN NOTED PAIN PRSNT: CPT | Performed by: INTERNAL MEDICINE

## 2025-07-31 PROCEDURE — 3044F HG A1C LEVEL LT 7.0%: CPT | Performed by: INTERNAL MEDICINE

## 2025-07-31 PROCEDURE — 3078F DIAST BP <80 MM HG: CPT | Performed by: INTERNAL MEDICINE

## 2025-07-31 PROCEDURE — 1159F MED LIST DOCD IN RCRD: CPT | Performed by: INTERNAL MEDICINE

## 2025-07-31 RX ORDER — METFORMIN HYDROCHLORIDE 500 MG/1
500 TABLET, EXTENDED RELEASE ORAL 2 TIMES DAILY
Qty: 180 TABLET | Refills: 1 | Status: SHIPPED | OUTPATIENT
Start: 2025-07-31

## 2025-07-31 RX ORDER — ROSUVASTATIN CALCIUM 10 MG/1
10 TABLET, COATED ORAL DAILY
Qty: 90 TABLET | Refills: 3 | Status: SHIPPED | OUTPATIENT
Start: 2025-07-31

## 2025-07-31 RX ORDER — LEVOTHYROXINE SODIUM 200 UG/1
200 TABLET ORAL
Qty: 90 TABLET | Refills: 3 | Status: SHIPPED | OUTPATIENT
Start: 2025-07-31

## 2025-07-31 ASSESSMENT — PAIN SCALES - GENERAL: PAINLEVEL_OUTOF10: 2

## 2025-07-31 ASSESSMENT — LIFESTYLE VARIABLES
HOW MANY STANDARD DRINKS CONTAINING ALCOHOL DO YOU HAVE ON A TYPICAL DAY: PATIENT DOES NOT DRINK
SKIP TO QUESTIONS 9-10: 1
HOW OFTEN DO YOU HAVE A DRINK CONTAINING ALCOHOL: NEVER
HOW OFTEN DO YOU HAVE SIX OR MORE DRINKS ON ONE OCCASION: NEVER
AUDIT-C TOTAL SCORE: 0

## 2025-07-31 ASSESSMENT — ENCOUNTER SYMPTOMS
DEPRESSION: 0
OCCASIONAL FEELINGS OF UNSTEADINESS: 0
LOSS OF SENSATION IN FEET: 0

## 2025-08-01 ENCOUNTER — TELEMEDICINE (OUTPATIENT)
Dept: PHARMACY | Facility: HOSPITAL | Age: 75
End: 2025-08-01
Payer: MEDICARE

## 2025-08-01 DIAGNOSIS — E11.65 TYPE 2 DIABETES MELLITUS WITH HYPERGLYCEMIA, WITHOUT LONG-TERM CURRENT USE OF INSULIN: Primary | ICD-10-CM

## 2025-08-01 NOTE — PROGRESS NOTES
[facility] [place] [facility]      Manfred Espinosa a 74 y.o. patient was referred to the Clinical Pharmacy Team for diabetes management.  Presents today via telephone for assessment and management,  &  PAP renewal.      Referring Provider: Raj Tracy MD     Pt has glucose meter, testing <1x/day, does not recall recent glycemic values, no lows. Pt is doing better on carb control in the diet. Pt walks on a daily basis.          Taking metformin 500mg ER 1 pill twice per day (as much as he tolerates),  mounjaro 7.5 (uhpap), had taken trulicity in the past  -diomedes 15mg in the past  -glimeperide 2mg in the past           Taking crestor 10mg for lipids and tolerating.          Taking Unithroid 200mcg (yisel)-pt back on and taking, see labs below.  Pt takes thyroid medication am and PPI qhs. Pt denies obstructive goiter sx, currently feeling tired.        Current Medications[1]    Allergies as of 08/01/2025 - Reviewed 08/01/2025   Allergen Reaction Noted    Nitroglycerin Other 09/06/2023    Oxycodone-acetaminophen Hives and Rash 09/06/2023    Nitroglycerin Unknown 10/24/2023    Oxycodone-acetaminophen Hallucinations and Unknown 10/24/2023       Labs:   Lab Results   Component Value Date    CALCIUM 9.1 07/29/2025    AST 18 07/29/2025    ALKPHOS 46 07/29/2025    BILITOT 1.3 (H) 07/29/2025    PROT 6.9 07/29/2025    ALBUMIN 4.5 07/29/2025    GLOB 3.0 08/29/2022    AGR 1.5 08/29/2022     07/29/2025    K 4.3 07/29/2025     07/29/2025    CO2 25 07/29/2025    ANIONGAP 10 07/29/2025    BUN 17 07/29/2025    CREATININE 1.04 07/29/2025    UREACREAUR 16.7 09/05/2023    GLUCOSE 116 07/29/2025    ALT 18 07/29/2025    EGFR 76 07/29/2025     Lab Results   Component Value Date    CHOL 110 01/08/2025    TRIG 80 01/08/2025    HDL 51.3 01/08/2025    LDLCALC 43 01/08/2025     Lab Results   Component Value Date    MICROALBCREA 6 07/29/2025     Lab Results   Component Value Date    HGBA1C 5.3 07/31/2025        Patient  Assistance Screening (VAF)  Patient verbally reports monthly or yearly income which is less than 400% federal poverty level.  Renewal application for program has been submitted for the following medications: Mounjaro  Patient has been informed that program team will be reaching out to them to discuss necessary documentation, instructed to answer phone/return voicemail.   Patient aware this process may take up to 6 weeks.   If approved medication must be filled through [facility] pharmacy and may be picked up or mailed to patient.    PATIENT EDUCATION/DISCUSSION:  - Counseled patient on MOA, expectations, side effects, duration of therapy, contraindications, administration, and monitoring parameters  - Answered all patient questions and concerns    Assessment/Plan   1. Type 2 diabetes mellitus with hyperglycemia, without long-term current use of insulin (Primary)    -can try lowering metformin to 1 pill daily until next visit, at next visit likely come off  -offered to increase mounjaro, pt would prefer to stay on 7.5mg dose      1. Continue all meds under the continuation of care with the referring provider and clinical pharmacy team.  2. Prescription(s) sent to [facility] pharmacy for assistance on authorization and copay. Medication will be mailed to patient.    Follow up as scheduled with Dr. Tracy & team in endocrinology office.  Follow up annually with clinical pharmacist for re-enrollment in [facility]    Thank you,   Beverley Hayes, PharmD, BC-[facility], CDCES     Verbal consent to manage patient's drug therapy was obtained from the patient and/or an individual authorized to act on behalf of a patient. They were informed they may decline to participate or withdraw from participation in pharmacy services at any time.         PAP EMAIL    Hello!    Please review for internal [employer] (VAF) renewal. Prescriptions have been sent to [facility].     Patient Name: Manfred ALSTON Francisca  :  1950   MRN: [id number]  Phone: [phone]   Delivery Preference: Mail  Priority: {JYOTIAPPriority:10621}   Filing Status: {Kwasitus:87097}  Household size: {#:[id number]}  Financial documents attached to this email:  {RogerDocuments:54118}  Medication(s): {KRSPAPMedOptions:30427}      *I have confirmed the above medications are listed on the current VAF formulary: [email or web address]    I acknowledge the [facility] staff will further reach out to the patient to confirm additional details as needed, including but not limited to collecting financial documentation, confirming delivery information, obtaining payment method for non-VAF medications.    Thank you,        [1]   Current Outpatient Medications:     blood sugar diagnostic (ONETOUCH ULTRA BLUE TEST STRIP MISC), Instructions: use one strip daily and as needed if symptomatic; Dx E11.9, Disp: , Rfl:     fluticasone (Flonase) 50 mcg/actuation nasal spray, Administer 2 sprays into each nostril once daily. Shake gently. Before first use, prime pump. After use, clean tip and replace cap., Disp: 16 g, Rfl: 11    levothyroxine (Unithroid) 200 mcg tablet, Take 1 tablet (200 mcg) by mouth once daily in the morning. Take before meals., Disp: 90 tablet, Rfl: 3    meloxicam (Mobic) 15 mg tablet, Take 1 tablet (15 mg) by mouth once daily., Disp: 30 tablet, Rfl: 11    metFORMIN  mg 24 hr tablet, Take 1 tablet (500 mg) by mouth 2 times a day., Disp: 180 tablet, Rfl: 1    pantoprazole (ProtoNix) 40 mg EC tablet, Take 1 tablet (40 mg) by mouth once daily in the morning. Take before meals., Disp: 90 tablet, Rfl: 3    rosuvastatin (Crestor) 10 mg tablet, Take 1 tablet (10 mg) by mouth once daily. For 90 days, Disp: 90 tablet, Rfl: 3    sildenafil (Viagra) 100 mg tablet, Take 1 tablet (100 mg) by mouth if needed for erectile dysfunction., Disp: , Rfl:      tamsulosin (Flomax) 0.4 mg 24 hr capsule, Take 1 capsule (0.4 mg) by mouth once daily., Disp: , Rfl:     tirzepatide (Mounjaro) 7.5 mg/0.5 mL pen injector, Inject 7.5 mg under the skin 1 (one) time per week., Disp: 6 mL, Rfl: 3

## 2025-08-04 ENCOUNTER — TELEPHONE (OUTPATIENT)
Facility: CLINIC | Age: 75
End: 2025-08-04
Payer: MEDICARE

## 2025-08-05 ENCOUNTER — APPOINTMENT (OUTPATIENT)
Dept: ENDOCRINOLOGY | Facility: CLINIC | Age: 75
End: 2025-08-05
Payer: MEDICARE

## 2025-08-29 PROCEDURE — RXMED WILLOW AMBULATORY MEDICATION CHARGE

## 2025-08-30 ENCOUNTER — PHARMACY VISIT (OUTPATIENT)
Dept: PHARMACY | Facility: CLINIC | Age: 75
End: 2025-08-30
Payer: MEDICARE

## 2025-09-02 ENCOUNTER — APPOINTMENT (OUTPATIENT)
Facility: CLINIC | Age: 75
End: 2025-09-02
Payer: MEDICARE

## 2025-09-11 ENCOUNTER — APPOINTMENT (OUTPATIENT)
Facility: CLINIC | Age: 75
End: 2025-09-11
Payer: MEDICARE